# Patient Record
Sex: FEMALE | Race: WHITE | Employment: UNEMPLOYED | URBAN - METROPOLITAN AREA
[De-identification: names, ages, dates, MRNs, and addresses within clinical notes are randomized per-mention and may not be internally consistent; named-entity substitution may affect disease eponyms.]

---

## 2018-03-16 ENCOUNTER — OFFICE VISIT (OUTPATIENT)
Dept: FAMILY MEDICINE CLINIC | Age: 38
End: 2018-03-16

## 2018-03-16 VITALS
HEIGHT: 61 IN | OXYGEN SATURATION: 99 % | WEIGHT: 128.6 LBS | BODY MASS INDEX: 24.28 KG/M2 | DIASTOLIC BLOOD PRESSURE: 70 MMHG | SYSTOLIC BLOOD PRESSURE: 104 MMHG | HEART RATE: 92 BPM

## 2018-03-16 DIAGNOSIS — M79.7 FIBROMYALGIA: ICD-10-CM

## 2018-03-16 DIAGNOSIS — G43.809 OTHER MIGRAINE WITHOUT STATUS MIGRAINOSUS, NOT INTRACTABLE: ICD-10-CM

## 2018-03-16 DIAGNOSIS — F33.41 RECURRENT MAJOR DEPRESSIVE DISORDER, IN PARTIAL REMISSION (HCC): Primary | ICD-10-CM

## 2018-03-16 DIAGNOSIS — R20.0 NUMBNESS AND TINGLING IN LEFT HAND: ICD-10-CM

## 2018-03-16 DIAGNOSIS — R20.2 NUMBNESS AND TINGLING IN LEFT HAND: ICD-10-CM

## 2018-03-16 DIAGNOSIS — N95.1 MENOPAUSAL SYMPTOM: ICD-10-CM

## 2018-03-16 DIAGNOSIS — M79.601 PAIN IN RIGHT ARM: ICD-10-CM

## 2018-03-16 PROCEDURE — G0444 DEPRESSION SCREEN ANNUAL: HCPCS | Performed by: FAMILY MEDICINE

## 2018-03-16 PROCEDURE — 99204 OFFICE O/P NEW MOD 45 MIN: CPT | Performed by: FAMILY MEDICINE

## 2018-03-16 RX ORDER — SUMATRIPTAN 100 MG/1
50 TABLET, FILM COATED ORAL
Qty: 9 TABLET | Refills: 1 | Status: SHIPPED | OUTPATIENT
Start: 2018-03-16 | End: 2018-11-16 | Stop reason: SDUPTHER

## 2018-03-16 RX ORDER — MELOXICAM 15 MG/1
15 TABLET ORAL DAILY
Qty: 30 TABLET | Refills: 3 | Status: SHIPPED | OUTPATIENT
Start: 2018-03-16 | End: 2019-04-10 | Stop reason: SDUPTHER

## 2018-03-16 RX ORDER — DULOXETIN HYDROCHLORIDE 60 MG/1
60 CAPSULE, DELAYED RELEASE ORAL DAILY
Qty: 30 CAPSULE | Refills: 3 | Status: SHIPPED | OUTPATIENT
Start: 2018-03-16 | End: 2018-07-03 | Stop reason: SDUPTHER

## 2018-03-16 RX ORDER — ESTRADIOL 1 MG/1
1 TABLET ORAL DAILY
Qty: 28 TABLET | Refills: 5 | Status: SHIPPED | OUTPATIENT
Start: 2018-03-16 | End: 2018-04-27

## 2018-03-16 ASSESSMENT — PATIENT HEALTH QUESTIONNAIRE - PHQ9
5. POOR APPETITE OR OVEREATING: 2
8. MOVING OR SPEAKING SO SLOWLY THAT OTHER PEOPLE COULD HAVE NOTICED. OR THE OPPOSITE, BEING SO FIGETY OR RESTLESS THAT YOU HAVE BEEN MOVING AROUND A LOT MORE THAN USUAL: 0
6. FEELING BAD ABOUT YOURSELF - OR THAT YOU ARE A FAILURE OR HAVE LET YOURSELF OR YOUR FAMILY DOWN: 1
2. FEELING DOWN, DEPRESSED OR HOPELESS: 2
SUM OF ALL RESPONSES TO PHQ9 QUESTIONS 1 & 2: 4
4. FEELING TIRED OR HAVING LITTLE ENERGY: 3
3. TROUBLE FALLING OR STAYING ASLEEP: 2
9. THOUGHTS THAT YOU WOULD BE BETTER OFF DEAD, OR OF HURTING YOURSELF: 0
7. TROUBLE CONCENTRATING ON THINGS, SUCH AS READING THE NEWSPAPER OR WATCHING TELEVISION: 1
1. LITTLE INTEREST OR PLEASURE IN DOING THINGS: 2
SUM OF ALL RESPONSES TO PHQ QUESTIONS 1-9: 13
10. IF YOU CHECKED OFF ANY PROBLEMS, HOW DIFFICULT HAVE THESE PROBLEMS MADE IT FOR YOU TO DO YOUR WORK, TAKE CARE OF THINGS AT HOME, OR GET ALONG WITH OTHER PEOPLE: 3

## 2018-04-01 ASSESSMENT — ENCOUNTER SYMPTOMS
BLURRED VISION: 0
COUGH: 0
BACK PAIN: 1
ABDOMINAL PAIN: 0

## 2018-04-27 ENCOUNTER — OFFICE VISIT (OUTPATIENT)
Dept: FAMILY MEDICINE CLINIC | Age: 38
End: 2018-04-27

## 2018-04-27 VITALS
DIASTOLIC BLOOD PRESSURE: 60 MMHG | WEIGHT: 130.8 LBS | BODY MASS INDEX: 25.12 KG/M2 | HEART RATE: 84 BPM | SYSTOLIC BLOOD PRESSURE: 116 MMHG | OXYGEN SATURATION: 98 %

## 2018-04-27 DIAGNOSIS — F33.41 RECURRENT MAJOR DEPRESSIVE DISORDER, IN PARTIAL REMISSION (HCC): Primary | ICD-10-CM

## 2018-04-27 DIAGNOSIS — R20.0 NUMBNESS AND TINGLING IN LEFT HAND: ICD-10-CM

## 2018-04-27 DIAGNOSIS — M79.7 FIBROMYALGIA: ICD-10-CM

## 2018-04-27 DIAGNOSIS — M54.2 NECK PAIN: ICD-10-CM

## 2018-04-27 DIAGNOSIS — R20.2 NUMBNESS AND TINGLING IN LEFT HAND: ICD-10-CM

## 2018-04-27 DIAGNOSIS — N95.1 MENOPAUSAL SYNDROME: ICD-10-CM

## 2018-04-27 DIAGNOSIS — Z13.31 POSITIVE DEPRESSION SCREENING: ICD-10-CM

## 2018-04-27 DIAGNOSIS — M79.601 PAIN IN RIGHT ARM: ICD-10-CM

## 2018-04-27 PROCEDURE — 1036F TOBACCO NON-USER: CPT | Performed by: FAMILY MEDICINE

## 2018-04-27 PROCEDURE — 99214 OFFICE O/P EST MOD 30 MIN: CPT | Performed by: FAMILY MEDICINE

## 2018-04-27 PROCEDURE — G8427 DOCREV CUR MEDS BY ELIG CLIN: HCPCS | Performed by: FAMILY MEDICINE

## 2018-04-27 PROCEDURE — G8431 POS CLIN DEPRES SCRN F/U DOC: HCPCS | Performed by: FAMILY MEDICINE

## 2018-04-27 PROCEDURE — G8419 CALC BMI OUT NRM PARAM NOF/U: HCPCS | Performed by: FAMILY MEDICINE

## 2018-04-27 RX ORDER — ESTRADIOL 1 MG/1
1.5 TABLET ORAL DAILY
Qty: 45 TABLET | Refills: 5 | Status: SHIPPED | OUTPATIENT
Start: 2018-04-27 | End: 2019-05-02 | Stop reason: SDUPTHER

## 2018-04-27 ASSESSMENT — ENCOUNTER SYMPTOMS
BLURRED VISION: 0
ABDOMINAL PAIN: 0
COUGH: 0

## 2018-06-07 ENCOUNTER — OFFICE VISIT (OUTPATIENT)
Dept: PHYSICAL MEDICINE AND REHAB | Age: 38
End: 2018-06-07

## 2018-06-07 DIAGNOSIS — M79.602 PARESTHESIA AND PAIN OF BOTH UPPER EXTREMITIES: ICD-10-CM

## 2018-06-07 DIAGNOSIS — M79.601 PARESTHESIA AND PAIN OF BOTH UPPER EXTREMITIES: ICD-10-CM

## 2018-06-07 DIAGNOSIS — R20.2 PARESTHESIA AND PAIN OF BOTH UPPER EXTREMITIES: ICD-10-CM

## 2018-06-07 DIAGNOSIS — G56.03 BILATERAL CARPAL TUNNEL SYNDROME: Primary | ICD-10-CM

## 2018-06-07 PROCEDURE — 95911 NRV CNDJ TEST 9-10 STUDIES: CPT | Performed by: PHYSICAL MEDICINE & REHABILITATION

## 2018-06-07 PROCEDURE — 95886 MUSC TEST DONE W/N TEST COMP: CPT | Performed by: PHYSICAL MEDICINE & REHABILITATION

## 2018-06-21 ENCOUNTER — HOSPITAL ENCOUNTER (OUTPATIENT)
Dept: GENERAL RADIOLOGY | Age: 38
Discharge: HOME | End: 2018-06-21
Attending: FAMILY MEDICINE | Admitting: FAMILY MEDICINE

## 2018-06-21 DIAGNOSIS — R20.0 NUMBNESS OF HAND: ICD-10-CM

## 2018-06-22 ENCOUNTER — PATIENT MESSAGE (OUTPATIENT)
Dept: FAMILY MEDICINE CLINIC | Age: 38
End: 2018-06-22

## 2018-06-22 RX ORDER — CEPHALEXIN 500 MG/1
1000 CAPSULE ORAL 2 TIMES DAILY
Qty: 40 CAPSULE | Refills: 0 | Status: SHIPPED | OUTPATIENT
Start: 2018-06-22 | End: 2019-04-10

## 2018-07-03 ENCOUNTER — OFFICE VISIT (OUTPATIENT)
Dept: FAMILY MEDICINE CLINIC | Age: 38
End: 2018-07-03

## 2018-07-03 VITALS
OXYGEN SATURATION: 97 % | HEART RATE: 87 BPM | WEIGHT: 131.4 LBS | SYSTOLIC BLOOD PRESSURE: 114 MMHG | DIASTOLIC BLOOD PRESSURE: 74 MMHG | BODY MASS INDEX: 25.24 KG/M2

## 2018-07-03 DIAGNOSIS — M79.7 FIBROMYALGIA: Primary | ICD-10-CM

## 2018-07-03 DIAGNOSIS — G43.809 OTHER MIGRAINE WITHOUT STATUS MIGRAINOSUS, NOT INTRACTABLE: ICD-10-CM

## 2018-07-03 DIAGNOSIS — F33.41 RECURRENT MAJOR DEPRESSIVE DISORDER, IN PARTIAL REMISSION (HCC): ICD-10-CM

## 2018-07-03 DIAGNOSIS — M79.18 CERVICAL MYOFASCIAL PAIN SYNDROME: ICD-10-CM

## 2018-07-03 PROCEDURE — G8427 DOCREV CUR MEDS BY ELIG CLIN: HCPCS | Performed by: FAMILY MEDICINE

## 2018-07-03 PROCEDURE — G8419 CALC BMI OUT NRM PARAM NOF/U: HCPCS | Performed by: FAMILY MEDICINE

## 2018-07-03 PROCEDURE — 99214 OFFICE O/P EST MOD 30 MIN: CPT | Performed by: FAMILY MEDICINE

## 2018-07-03 PROCEDURE — 1036F TOBACCO NON-USER: CPT | Performed by: FAMILY MEDICINE

## 2018-07-03 RX ORDER — DULOXETIN HYDROCHLORIDE 60 MG/1
60 CAPSULE, DELAYED RELEASE ORAL 2 TIMES DAILY
Qty: 60 CAPSULE | Refills: 3 | Status: SHIPPED | OUTPATIENT
Start: 2018-07-03 | End: 2018-11-16 | Stop reason: SDUPTHER

## 2018-07-03 NOTE — PROGRESS NOTES
Chief Complaint   Patient presents with    Depression     Patient is here for a recheck on major depressive disorder and fatigue    Neurologic Problem     Follow up from consult with Dr Alina Calzada and EMG    Migraine     Patient with recurrent migraines and also fibromyalgia, this was diagnosed by previous physician, she is on Mobic and Cymbalta 60 daily and had previous therapy including need living and cupping that were helpful       [place] NARRATIVE:    Mild CTS by EMG. Report available in chart. Suspect myofascial pain syndrome per Dr. Alina Calzada. C/o fibromyalgia like symptoms, previous doctor diagnosed her with fibromyalgia. She is on mobic and cymbalta 60 daily. Previously helped by needling and cupping. C/o fatigue and having to work in office for days making her tired and bored. This patient with bad migraine last week and some persistent sx into this week. Used imitrex with good results. Patient is established unless otherwise noted. Above chief complaint and [place] obtained by this physician provider. Review of Systems   Constitutional: Positive for malaise/fatigue. Negative for fever. HENT: Negative for congestion. Eyes: Negative for blurred vision. Respiratory: Negative for cough. Cardiovascular: Negative for chest pain and leg swelling. Gastrointestinal: Negative for abdominal pain. Musculoskeletal: Positive for back pain, myalgias and neck pain. Skin: Negative for rash. Neurological: Positive for tingling and headaches. Psychiatric/Behavioral: Negative for depression. The patient is not nervous/anxious. Allergies   Allergen Reactions    Benadryl [Diphenhydramine Hcl] Other (See Comments)     Shaking and itching    Pseudoeph-Doxylamine-Dm-Apap Other (See Comments)     Shaking and itching      Bactrim [Sulfamethoxazole-Trimethoprim] Nausea And Vomiting     Allergy history updated.     Outpatient Prescriptions Marked as Taking for the 7/3/18 encounter (Office reviewed. _________________________________________________  Assessment:     Erica Barajas was seen today for depression, neurologic problem and migraine. Diagnoses and all orders for this visit:    Fibromyalgia  -     [facility]    Recurrent major depressive disorder, in partial remission ([facility] [address]  -     DULoxetine (CYMBALTA) 60 MG extended release capsule; Take 1 capsule by mouth 2 times daily    Cervical myofascial pain syndrome  -     [facility]    Other migraine without status migrainosus, not intractable      Problems listed above are stable and therapeutic plan is unchanged unless otherwise specified. Multiple problems above are not doing well. Only the migraine is stable. We'll keep current medications but make changes as below. See orders above and comments below for details of workup or medication orders. _________________________________________________  Plan:     Options for treatment discussed. We will not proceed with formal carpal tunnel surgical referral.  Physical medicine thinks most of her problems relate to fibromyalgia. We will double her Cymbalta to 60 twice a day and see if this helps with mood and fibromyalgia pain. We will refer to physical therapy for assistance in managing fibromyalgia and more localized cervical myofascial pain syndrome. We will not change medications for migraines at this time but continue current therapy. Return in about 2 months (around 9/3/2018), or if symptoms worsen or fail to improve, for recheck on new med.       Electronically signed by Chiquita Victoria MD on 7/3/18 at 9:20 AM

## 2018-07-23 ENCOUNTER — HOSPITAL ENCOUNTER (OUTPATIENT)
Dept: OTHER | Age: 38
Discharge: HOME | End: 2018-07-31
Attending: FAMILY MEDICINE | Admitting: FAMILY MEDICINE

## 2018-07-23 PROBLEM — F33.41 RECURRENT MAJOR DEPRESSIVE DISORDER, IN PARTIAL REMISSION (HCC): Status: ACTIVE | Noted: 2018-07-23

## 2018-07-23 PROBLEM — G43.909 MIGRAINE: Status: ACTIVE | Noted: 2018-07-23

## 2018-07-23 PROBLEM — M79.18 CERVICAL MYOFASCIAL PAIN SYNDROME: Status: ACTIVE | Noted: 2018-07-23

## 2018-07-23 PROBLEM — M79.7 FIBROMYALGIA: Status: ACTIVE | Noted: 2018-07-23

## 2018-07-23 ASSESSMENT — ENCOUNTER SYMPTOMS
BLURRED VISION: 0
ABDOMINAL PAIN: 0
BACK PAIN: 1
COUGH: 0

## 2018-08-01 ENCOUNTER — HOSPITAL ENCOUNTER (OUTPATIENT)
Dept: OTHER | Age: 38
Discharge: HOME | End: 2018-08-31
Attending: FAMILY MEDICINE | Admitting: FAMILY MEDICINE

## 2018-08-09 ENCOUNTER — PATIENT MESSAGE (OUTPATIENT)
Dept: FAMILY MEDICINE CLINIC | Age: 38
End: 2018-08-09

## 2018-08-09 NOTE — TELEPHONE ENCOUNTER
From: Elfego Carrasco  To: Nahomi Childress MD  Sent: 8/9/2018 2:22 PM EDT  Subject: Prescription Question    Hi Tati, well I am finally moved to [place] and it has been a hot mess from day one. Anyways, I was messaging to see if there is a way I could get a small RX for some Tramadol or something. I think I spelled that wrong too. The twice a day Cymbalata is not even taking the edge off of my pain. I can't afford to see a new physician yet - I lost my job in the process of our move.      My new phone number is [phone]  Thanks Bentley gonsales

## 2018-11-16 DIAGNOSIS — G43.809 OTHER MIGRAINE WITHOUT STATUS MIGRAINOSUS, NOT INTRACTABLE: ICD-10-CM

## 2018-11-16 DIAGNOSIS — F33.41 RECURRENT MAJOR DEPRESSIVE DISORDER, IN PARTIAL REMISSION (HCC): ICD-10-CM

## 2018-11-16 NOTE — TELEPHONE ENCOUNTER
From: Francisco Burton  Sent: 11/16/2018 11:20 AM EST  Subject: Medication Renewal Request    Francisco Burton would like a refill of the following medications:     SUMAtriptan (IMITREX) 100 MG tablet Martin Godinez MD]     DULoxetine (CYMBALTA) 60 MG extended release capsule Martin Godinez MD]    Preferred pharmacy: [address] [facility] [phone] - F [phone]

## 2018-11-19 RX ORDER — DULOXETIN HYDROCHLORIDE 60 MG/1
60 CAPSULE, DELAYED RELEASE ORAL 2 TIMES DAILY
Qty: 60 CAPSULE | Refills: 1 | Status: SHIPPED | OUTPATIENT
Start: 2018-11-19 | End: 2019-03-04 | Stop reason: SDUPTHER

## 2018-11-19 RX ORDER — SUMATRIPTAN 100 MG/1
50 TABLET, FILM COATED ORAL
Qty: 9 TABLET | Refills: 1 | Status: SHIPPED | OUTPATIENT
Start: 2018-11-19 | End: 2019-04-10 | Stop reason: SDUPTHER

## 2019-04-10 ENCOUNTER — OFFICE VISIT (OUTPATIENT)
Dept: FAMILY MEDICINE CLINIC | Age: 39
End: 2019-04-10
Payer: COMMERCIAL

## 2019-04-10 VITALS
HEIGHT: 61 IN | OXYGEN SATURATION: 93 % | BODY MASS INDEX: 24.24 KG/M2 | DIASTOLIC BLOOD PRESSURE: 60 MMHG | SYSTOLIC BLOOD PRESSURE: 94 MMHG | HEART RATE: 84 BPM | WEIGHT: 128.4 LBS

## 2019-04-10 DIAGNOSIS — F33.41 RECURRENT MAJOR DEPRESSIVE DISORDER, IN PARTIAL REMISSION (HCC): ICD-10-CM

## 2019-04-10 DIAGNOSIS — G56.21 ULNAR NERVE ENTRAPMENT AT ELBOW, RIGHT: ICD-10-CM

## 2019-04-10 DIAGNOSIS — M79.7 FIBROMYALGIA: Primary | ICD-10-CM

## 2019-04-10 DIAGNOSIS — G43.809 OTHER MIGRAINE WITHOUT STATUS MIGRAINOSUS, NOT INTRACTABLE: ICD-10-CM

## 2019-04-10 DIAGNOSIS — B35.1 ONYCHOMYCOSIS OF TOENAIL: ICD-10-CM

## 2019-04-10 PROCEDURE — 99214 OFFICE O/P EST MOD 30 MIN: CPT | Performed by: FAMILY MEDICINE

## 2019-04-10 PROCEDURE — 1036F TOBACCO NON-USER: CPT | Performed by: FAMILY MEDICINE

## 2019-04-10 PROCEDURE — G8427 DOCREV CUR MEDS BY ELIG CLIN: HCPCS | Performed by: FAMILY MEDICINE

## 2019-04-10 PROCEDURE — G8420 CALC BMI NORM PARAMETERS: HCPCS | Performed by: FAMILY MEDICINE

## 2019-04-10 RX ORDER — SUMATRIPTAN 100 MG/1
50 TABLET, FILM COATED ORAL
Qty: 9 TABLET | Refills: 3 | Status: SHIPPED | OUTPATIENT
Start: 2019-04-10 | End: 2020-06-09

## 2019-04-10 RX ORDER — LIDOCAINE 50 MG/G
3 PATCH TOPICAL DAILY
Qty: 90 PATCH | Refills: 0 | Status: SHIPPED | OUTPATIENT
Start: 2019-04-10 | End: 2019-06-25 | Stop reason: SDUPTHER

## 2019-04-10 RX ORDER — MELOXICAM 15 MG/1
15 TABLET ORAL DAILY
Qty: 30 TABLET | Refills: 3 | Status: SHIPPED | OUTPATIENT
Start: 2019-04-10 | End: 2020-07-30

## 2019-04-10 RX ORDER — MINOCYCLINE HYDROCHLORIDE 100 MG/1
1 CAPSULE ORAL 2 TIMES DAILY
COMMUNITY
End: 2020-07-30

## 2019-04-10 RX ORDER — TERBINAFINE HYDROCHLORIDE 250 MG/1
250 TABLET ORAL DAILY
Qty: 7 TABLET | Refills: 3 | Status: SHIPPED | OUTPATIENT
Start: 2019-04-10 | End: 2020-06-09

## 2019-04-10 RX ORDER — DULOXETIN HYDROCHLORIDE 60 MG/1
60 CAPSULE, DELAYED RELEASE ORAL 2 TIMES DAILY
Qty: 60 CAPSULE | Refills: 5 | Status: SHIPPED | OUTPATIENT
Start: 2019-04-10 | End: 2020-04-06 | Stop reason: SDUPTHER

## 2019-04-10 ASSESSMENT — ENCOUNTER SYMPTOMS
CHEST TIGHTNESS: 0
ABDOMINAL PAIN: 0
WHEEZING: 0
COUGH: 0
BACK PAIN: 0
SHORTNESS OF BREATH: 0

## 2019-04-10 ASSESSMENT — PATIENT HEALTH QUESTIONNAIRE - PHQ9
SUM OF ALL RESPONSES TO PHQ9 QUESTIONS 1 & 2: 0
2. FEELING DOWN, DEPRESSED OR HOPELESS: 0
SUM OF ALL RESPONSES TO PHQ QUESTIONS 1-9: 0
1. LITTLE INTEREST OR PLEASURE IN DOING THINGS: 0
SUM OF ALL RESPONSES TO PHQ QUESTIONS 1-9: 0

## 2019-04-10 NOTE — PROGRESS NOTES
Allergies   Allergen Reactions    Benadryl [Diphenhydramine Hcl] Other (See Comments)     Shaking and itching    Pseudoeph-Doxylamine-Dm-Apap Other (See Comments)     Shaking and itching      Bactrim [Sulfamethoxazole-Trimethoprim] Nausea And Vomiting     Allergy historyupdated. Outpatient Medications Marked as Taking for the 4/10/19 encounter (Office Visit) with Donta Kang MD   Medication Sig Dispense Refill    minocycline (MINOCIN;DYNACIN) 100 MG capsule Take 1 capsule by mouth 2 times daily      lidocaine (LIDODERM) 5 % Place 3 patches onto the skin daily 12 hours on, 12 hours off. 90 patch 0    DULoxetine (CYMBALTA) 60 MG extended release capsule Take 1 capsule by mouth 2 times daily 60 capsule 5    SUMAtriptan (IMITREX) 100 MG tablet Take 0.5 tablets by mouth once as needed for Migraine 9 tablet 3    meloxicam (MOBIC) 15 MG tablet Take 1 tablet by mouth daily 30 tablet 3    [] terbinafine (LAMISIL) 250 MG tablet Take 1 tablet by mouth daily for 7 days Take daily for one week out of each month for 4 months total 7 tablet 3    estradiol (ESTRACE) 1 MG tablet Take 1.5 tablets by mouth daily 45 tablet 5       Tobacco use history updated. Social History     Tobacco Use   Smoking Status Former Smoker    Types: E-Cigarettes   Smokeless Tobacco Never Used        Nursing note reviewed. Vitals:    04/10/19 0856   BP: 94/60   Site: Left Upper Arm   Position: Sitting   Cuff Size: Small Adult   Pulse: 84   SpO2: 93%   Weight: 128 lb 6.4 oz (58.2 kg)   Height: 5' 0.5\" (1.537 m)          BP Readings from Last 3 Encounters:   04/10/19 94/60   18 114/74   18 116/60     Wt Readings from Last 3 Encounters:   04/10/19 128 lb 6.4 oz (58.2 kg)   18 131 lb 6.4 oz (59.6 kg)   18 130 lb 12.8 oz (59.3 kg)     Body mass index is 24.66 kg/m². No results found for this visit on 04/10/19. Physical Exam   Constitutional: She is oriented to person, place, and time.  She appears well-developed and well-nourished. No distress. HENT:   Head: Normocephalic. Mouth/Throat: Oropharynx is clear and moist.   Eyes: Pupils are equal, round, and reactive to light. Conjunctivae are normal. Right eye exhibits no discharge. Left eye exhibits no discharge. Neck: Normal range of motion. Cardiovascular: Normal rate, regular rhythm and normal heart sounds. No murmur heard. Pulmonary/Chest: Effort normal and breath sounds normal. No respiratory distress. She has no wheezes. She has no rales. Neurological: She is alert and oriented to person, place, and time. Skin: Skin is warm and dry. She is not diaphoretic. Psychiatric: She has a normal mood and affect. Her behavior is normal.   Nursing note and vitals reviewed. _________________________________________________  Assessment:     Kathrin Hanson was seen today for chronic pain, carpal tunnel, depression and migraine. Diagnoses and all orders for this visit:    Fibromyalgia  -     lidocaine (LIDODERM) 5 %; Place 3 patches onto the skin daily 12 hours on, 12 hours off.  -     meloxicam (MOBIC) 15 MG tablet; Take 1 tablet by mouth daily    Ulnar nerve entrapment at elbow, right  -     lidocaine (LIDODERM) 5 %; Place 3 patches onto the skin daily 12 hours on, 12 hours off. Recurrent major depressive disorder, in partial remission (HCC)  -     DULoxetine (CYMBALTA) 60 MG extended release capsule; Take 1 capsule by mouth 2 times daily    Other migraine without status migrainosus, not intractable  -     SUMAtriptan (IMITREX) 100 MG tablet; Take 0.5 tablets by mouth once as needed for Migraine    Onychomycosis of toenail  -     terbinafine (LAMISIL) 250 MG tablet; Take 1 tablet by mouth daily for 7 days Take daily for one week out of each month for 4 months total      Problems listed above are stable and therapeutic plan is unchanged unless otherwise specified.   See orders above and comments below for details of workup ormedication orders. _________________________________________________  Plan:     Declines hand surgeon referral for now. Will proceed with chiro treatment plans. Refills as above for migraine, she will track frequency and severity and triggers. Pulsed dose trial of medication for onychomycosis. Return in about 2 months (around 6/10/2019), or if symptoms worsen or fail to improve, for recheck chronic medical problem.       Electronically signed by Jasiel Herr MD on 4/10/19 at 9:12 AM

## 2019-05-02 DIAGNOSIS — N95.1 MENOPAUSAL SYNDROME: ICD-10-CM

## 2019-05-05 RX ORDER — ESTRADIOL 1 MG/1
TABLET ORAL
Qty: 30 TABLET | Refills: 8 | Status: SHIPPED | OUTPATIENT
Start: 2019-05-05 | End: 2020-02-13

## 2019-05-15 ENCOUNTER — TELEPHONE (OUTPATIENT)
Dept: FAMILY MEDICINE CLINIC | Age: 39
End: 2019-05-15

## 2019-05-15 NOTE — TELEPHONE ENCOUNTER
Please let patient know we were unable to get this medication approved. We can have her see PT or a pain specialist to evaluate her elbow pain. Unfortunately it is very difficult to get this medication approved for most diagnoses.

## 2019-06-25 ENCOUNTER — OFFICE VISIT (OUTPATIENT)
Dept: FAMILY MEDICINE CLINIC | Age: 39
End: 2019-06-25
Payer: COMMERCIAL

## 2019-06-25 VITALS
WEIGHT: 127 LBS | DIASTOLIC BLOOD PRESSURE: 60 MMHG | SYSTOLIC BLOOD PRESSURE: 112 MMHG | BODY MASS INDEX: 24.39 KG/M2 | OXYGEN SATURATION: 96 % | HEART RATE: 87 BPM

## 2019-06-25 DIAGNOSIS — M54.50 CHRONIC MIDLINE LOW BACK PAIN WITHOUT SCIATICA: Primary | ICD-10-CM

## 2019-06-25 DIAGNOSIS — G89.29 CHRONIC MIDLINE LOW BACK PAIN WITHOUT SCIATICA: Primary | ICD-10-CM

## 2019-06-25 DIAGNOSIS — G56.01 RIGHT CARPAL TUNNEL SYNDROME: ICD-10-CM

## 2019-06-25 DIAGNOSIS — G56.21 ULNAR NERVE ENTRAPMENT AT ELBOW, RIGHT: ICD-10-CM

## 2019-06-25 DIAGNOSIS — M79.7 FIBROMYALGIA: ICD-10-CM

## 2019-06-25 PROCEDURE — 99214 OFFICE O/P EST MOD 30 MIN: CPT | Performed by: FAMILY MEDICINE

## 2019-06-25 PROCEDURE — G8427 DOCREV CUR MEDS BY ELIG CLIN: HCPCS | Performed by: FAMILY MEDICINE

## 2019-06-25 PROCEDURE — 1036F TOBACCO NON-USER: CPT | Performed by: FAMILY MEDICINE

## 2019-06-25 PROCEDURE — G8420 CALC BMI NORM PARAMETERS: HCPCS | Performed by: FAMILY MEDICINE

## 2019-06-25 RX ORDER — LIDOCAINE 50 MG/G
3 PATCH TOPICAL DAILY
Qty: 90 PATCH | Refills: 0 | Status: SHIPPED | OUTPATIENT
Start: 2019-06-25 | End: 2019-07-25

## 2019-06-25 ASSESSMENT — PATIENT HEALTH QUESTIONNAIRE - PHQ9
SUM OF ALL RESPONSES TO PHQ QUESTIONS 1-9: 0
SUM OF ALL RESPONSES TO PHQ QUESTIONS 1-9: 0
2. FEELING DOWN, DEPRESSED OR HOPELESS: 0
1. LITTLE INTEREST OR PLEASURE IN DOING THINGS: 0
SUM OF ALL RESPONSES TO PHQ9 QUESTIONS 1 & 2: 0

## 2019-06-25 NOTE — PROGRESS NOTES
reactive to light. Conjunctivae are normal. Right eye exhibits no discharge. Left eye exhibits no discharge. Neck: Normal range of motion. Cardiovascular: Normal rate, regular rhythm and normal heart sounds. No murmur heard. Pulmonary/Chest: Effort normal and breath sounds normal. No respiratory distress. She has no wheezes. She has no rales. Musculoskeletal: She exhibits tenderness (Mild tenderness to palpation of low back with spasm present and paraspinal muscles bilaterally). She exhibits no edema or deformity. Neurological: She is alert and oriented to person, place, and time. She displays normal reflexes. No cranial nerve deficit or sensory deficit (Tingling and numbness in the right hand). She exhibits normal muscle tone. Coordination normal.   Skin: Skin is warm and dry. She is not diaphoretic. Psychiatric: She has a normal mood and affect. Her behavior is normal.   Nursing note and vitals reviewed. _________________________________________________  Assessment:     Maritza Menard was seen today for chronic pain, back pain and hand pain. Diagnoses and all orders for this visit:    Chronic midline low back pain without sciatica  -     XR LUMBAR SPINE (2-3 VIEWS); Future  -     lidocaine (LIDODERM) 5 %; Place 3 patches onto the skin daily 12 hours on, 12 hours off. Right carpal tunnel syndrome  -     Moshe Collier MD, Physical Medicine, [place]    Fibromyalgia    Ulnar nerve entrapment at elbow, right      Problems listed above are stable except as follows: Midline low back pain is worsened. Hands are not improved and she is asking for referral as current conservative interventions have not been helpful. We also need to diagnose any nerve entrapment and consider referral for that. Therapeutic plan is unchanged unless otherwise specified.       See orders above and comments below for details of workup or medication

## 2019-07-02 ENCOUNTER — TELEPHONE (OUTPATIENT)
Dept: FAMILY MEDICINE CLINIC | Age: 39
End: 2019-07-02

## 2019-07-02 NOTE — TELEPHONE ENCOUNTER
----- Message from Carmen Long sent at 7/2/2019  8:21 AM EDT -----      ----- Message -----  From: Krista Dangelo MD  Sent: 6/25/2019   4:43 PM  To: Bobby Ojeda MA    Can you resend referral to [facility] please she had never heard she will need emg of right and left arms I think

## 2019-07-15 ASSESSMENT — ENCOUNTER SYMPTOMS
BACK PAIN: 1
ABDOMINAL PAIN: 0
WHEEZING: 0
SHORTNESS OF BREATH: 0
COUGH: 0
CHEST TIGHTNESS: 0

## 2019-08-23 ENCOUNTER — OFFICE VISIT (OUTPATIENT)
Dept: FAMILY MEDICINE CLINIC | Age: 39
End: 2019-08-23
Payer: COMMERCIAL

## 2019-08-23 VITALS
OXYGEN SATURATION: 99 % | DIASTOLIC BLOOD PRESSURE: 64 MMHG | HEART RATE: 79 BPM | HEIGHT: 61 IN | BODY MASS INDEX: 23.71 KG/M2 | WEIGHT: 125.6 LBS | TEMPERATURE: 98.5 F | SYSTOLIC BLOOD PRESSURE: 102 MMHG

## 2019-08-23 DIAGNOSIS — M21.612 BUNION OF GREAT TOE OF LEFT FOOT: Primary | ICD-10-CM

## 2019-08-23 DIAGNOSIS — M21.611 BUNION OF GREAT TOE OF RIGHT FOOT: ICD-10-CM

## 2019-08-23 PROCEDURE — G8427 DOCREV CUR MEDS BY ELIG CLIN: HCPCS | Performed by: NURSE PRACTITIONER

## 2019-08-23 PROCEDURE — G8420 CALC BMI NORM PARAMETERS: HCPCS | Performed by: NURSE PRACTITIONER

## 2019-08-23 PROCEDURE — 99213 OFFICE O/P EST LOW 20 MIN: CPT | Performed by: NURSE PRACTITIONER

## 2019-08-23 PROCEDURE — 1036F TOBACCO NON-USER: CPT | Performed by: NURSE PRACTITIONER

## 2019-08-23 RX ORDER — NAPROXEN 500 MG/1
500 TABLET ORAL 2 TIMES DAILY WITH MEALS
Qty: 14 TABLET | Refills: 3 | Status: SHIPPED | OUTPATIENT
Start: 2019-08-23 | End: 2020-07-30 | Stop reason: SDUPTHER

## 2019-08-23 ASSESSMENT — ENCOUNTER SYMPTOMS
WHEEZING: 0
ABDOMINAL DISTENTION: 0
SHORTNESS OF BREATH: 0
DIARRHEA: 0
EYE PAIN: 0
CHEST TIGHTNESS: 0
NAUSEA: 0
TROUBLE SWALLOWING: 0
ABDOMINAL PAIN: 0
RHINORRHEA: 0

## 2019-08-23 NOTE — PATIENT INSTRUCTIONS
your foot on a pillow when you ice your toe or anytime you sit or lie down. Try to keep it above the level of your heart. This will help reduce swelling. When should you call for help? Call your doctor now or seek immediate medical care if:    · You have severe pain.     · Your toe is cool or pale or changes color.     · You have tingling, weakness, or numbness in the toe.    Watch closely for changes in your health, and be sure to contact your doctor if:    · Pain and swelling get worse.     · You do not get better as expected. Where can you learn more? Go to https://Magma Flooringpepiceweb.Cisiv. org and sign in to your [employer] account. Enter H210 in the TheOfficialBoard box to learn more about \"[place]: Care Instructions. \"     If you do not have an account, please click on the \"Sign Up Now\" link. Current as of: September 20, 2018  Content Version: 12.1  © 7029-5960 Healthwise, Incorporated. Care instructions adapted under license by [facility] ([facility]). If you have questions about a medical condition or this instruction, always ask your healthcare professional. [place] [address] any warranty or liability for your use of this information.

## 2019-08-23 NOTE — PROGRESS NOTES
Tio Osborn  1980   44 y.o. SUBJECT MAR[employer]:    Chief Complaint   Patient presents with    Toe Pain     both feet      Past Medical History:   Diagnosis Date    Endometriosis     Migraines      Past Surgical History:   Procedure Laterality Date    ABDOMEN SURGERY      endometriosis    HYSTERECTOMY       Current Outpatient Medications   Medication Sig Dispense Refill    naproxen (NAPROXEN) 500 MG EC tablet Take 1 tablet by mouth 2 times daily (with meals) 14 tablet 3    estradiol (ESTRACE) 1 MG tablet TAKE 1 & 1/2 (ONE & ONE-HALF) TABLETS BY MOUTH ONCE DAILY 30 tablet 8    minocycline (MINOCIN;DYNACIN) 100 MG capsule Take 1 capsule by mouth 2 times daily      DULoxetine (CYMBALTA) 60 MG extended release capsule Take 1 capsule by mouth 2 times daily 60 capsule 5    meloxicam (MOBIC) 15 MG tablet Take 1 tablet by mouth daily 30 tablet 3    SUMAtriptan (IMITREX) 100 MG tablet Take 0.5 tablets by mouth once as needed for Migraine 9 tablet 3     No current facility-administered medications for this visit. Family History   Problem Relation Age of Onset   Hair Cancer Mother     Other Mother         epilepsy    Depression Mother     High Cholesterol Father     Hypertension Father     Allergies Father        Foot Pain    Pain location: bilateral great toes. This is a new problem. The current episode started more than 1 month ago. The problem occurs constantly. The problem has been gradually worsening. The quality of the pain is described as burning. The pain is at a severity of 5/10. The pain is moderate. Pertinent negatives include no fever, itching, joint locking, numbness or tingling. Associated symptoms comments: Pain with walking. . The symptoms are aggravated by activity. She has tried nothing for the symptoms. The treatment provided no relief. Family history does not include gout or rheumatoid arthritis. There is no history of diabetes, gout, osteoarthritis or rheumatoid arthritis. Review of Systems   Constitutional: Negative. Negative for appetite change, fatigue and fever. HENT: Negative for congestion, dental problem, hearing loss, rhinorrhea and trouble swallowing. Eyes: Negative for pain. Respiratory: Negative for chest tightness, shortness of breath and wheezing. Cardiovascular: Negative for chest pain, palpitations and leg swelling. Gastrointestinal: Negative for abdominal distention, abdominal pain, diarrhea and nausea. Endocrine: Negative for cold intolerance and polydipsia. Genitourinary: Negative for difficulty urinating and frequency. Musculoskeletal: Negative for arthralgias, gait problem and gout. Bilateral great toe pain. Skin: Negative for itching and rash. Allergic/Immunologic: Negative for environmental allergies. Neurological: Negative for dizziness, tingling, seizures, syncope and numbness. Hematological: Does not bruise/bleed easily. Psychiatric/Behavioral: Negative for behavioral problems and suicidal ideas. OBJECTIVE:     /64   Pulse 79   Temp 98.5 °F (36.9 °C) (Oral)   Ht 5' 1\" (1.549 m)   Wt 125 lb 9.6 oz (57 kg)   LMP 10/10/2012   SpO2 99%   Breastfeeding? No   BMI 23.73 kg/m²   Wt Readings from Last 3 Encounters:   08/23/19 125 lb 9.6 oz (57 kg)   06/25/19 127 lb (57.6 kg)   04/10/19 128 lb 6.4 oz (58.2 kg)       Physical Exam   Constitutional: She is oriented to person, place, and time. She appears well-developed and well-nourished. HENT:   Head: Normocephalic and atraumatic. Right Ear: Hearing, tympanic membrane and ear canal normal.   Left Ear: Hearing, tympanic membrane and ear canal normal.   Nose: Nose normal.   Mouth/Throat: Uvula is midline and oropharynx is clear and moist.   Eyes: Pupils are equal, round, and reactive to light. Conjunctivae are normal.   Neck: Normal range of motion. Cardiovascular: Normal rate, regular rhythm and normal heart sounds.    Pulmonary/Chest: Effort normal and breath sounds normal. She has no decreased breath sounds. She has no wheezes. She has no rhonchi. She has no rales. Abdominal: Soft. Bowel sounds are normal. There is no tenderness. Musculoskeletal: Normal range of motion. Bilateral great toe bunions, mild erythema and tenderness. Normal ROM. +pedal pulses. No s/s of infection. Neurological: She is alert and oriented to person, place, and time. Skin: Skin is warm and dry. No rash noted. Psychiatric: She has a normal mood and affect. Her behavior is normal. Judgment normal.       ASSESSMENT/ PLAN:    1. Bunion of great toe of left foot  - Discussed ice 3-4 times daily.   - NSAIDS. - Foot pads for lateral side. - Podiatry and xray to evaluate severity. - XR TOE LEFT (MIN 2 VIEWS); Future  - naproxen (NAPROXEN) 500 MG EC tablet; Take 1 tablet by mouth 2 times daily (with meals)  Dispense: 14 tablet; Refill: 3  - External Referral To Podiatry    2. Bunion of great toe of right foot  - Discussed ice 3-4 times daily.   - NSAIDS. - Foot pads for lateral side. - Podiatry and xray to evaluate severity. - XR TOE RIGHT (MIN 2 VIEWS); Future  - naproxen (NAPROXEN) 500 MG EC tablet; Take 1 tablet by mouth 2 times daily (with meals)  Dispense: 14 tablet; Refill: 3  - External Referral To Podiatry  - All prescriptions ordered and discussed in detail with patient; aware of possible side effects  - AVS explained and given to patient today  - All questions answered and patient verbalizes understanding, no further questions or concerns  - Aware to follow up with any worsening or no improvement in symptoms      Orders Placed This Encounter   Procedures    XR TOE RIGHT (MIN 2 VIEWS)     Standing Status:   Future     Standing Expiration Date:   8/23/2020     Order Specific Question:   Reason for exam:     Answer:   bunion, evaluate severity.     XR TOE LEFT (MIN 2 VIEWS)     Standing Status:   Future     Standing Expiration Date:   8/23/2020     Order Specific

## 2019-08-23 NOTE — LETTER
[facility],[address] Floor WALK-IN CARE  [address] [place] [address] [address] [id number]  Phone: [phone]  Fax: Wily Pham 23, APRN - CNP        August 23, 2019     Patient: Tacho Hurtado   YOB: 1980   Date of Visit: 8/23/2019       To Whom It May Concern: It is my medical opinion that Tacho Hurtado on 8/26/19. If you have any questions or concerns, please don't hesitate to call.     Sincerely,        DEBBY Naidu - CNP

## 2019-08-28 ENCOUNTER — OFFICE VISIT (OUTPATIENT)
Dept: FAMILY MEDICINE CLINIC | Age: 39
End: 2019-08-28
Payer: COMMERCIAL

## 2019-08-28 VITALS
BODY MASS INDEX: 23.32 KG/M2 | SYSTOLIC BLOOD PRESSURE: 96 MMHG | OXYGEN SATURATION: 100 % | WEIGHT: 123.4 LBS | DIASTOLIC BLOOD PRESSURE: 66 MMHG | HEART RATE: 84 BPM

## 2019-08-28 DIAGNOSIS — G56.03 BILATERAL CARPAL TUNNEL SYNDROME: Primary | ICD-10-CM

## 2019-08-28 PROCEDURE — G8427 DOCREV CUR MEDS BY ELIG CLIN: HCPCS | Performed by: NURSE PRACTITIONER

## 2019-08-28 PROCEDURE — G8420 CALC BMI NORM PARAMETERS: HCPCS | Performed by: NURSE PRACTITIONER

## 2019-08-28 PROCEDURE — 99213 OFFICE O/P EST LOW 20 MIN: CPT | Performed by: NURSE PRACTITIONER

## 2019-08-28 PROCEDURE — 1036F TOBACCO NON-USER: CPT | Performed by: NURSE PRACTITIONER

## 2019-08-28 ASSESSMENT — ENCOUNTER SYMPTOMS
CHEST TIGHTNESS: 0
NAUSEA: 0
SORE THROAT: 0
CHANGE IN BOWEL HABIT: 0
COUGH: 0
SHORTNESS OF BREATH: 0
VOMITING: 0
SWOLLEN GLANDS: 0
ABDOMINAL PAIN: 0
VISUAL CHANGE: 0
ABDOMINAL DISTENTION: 0

## 2019-08-28 NOTE — PATIENT INSTRUCTIONS
Be safe with medicines. Read and follow all instructions on the label. · If your doctor prescribes corticosteroid medicine to help reduce pain and swelling, take it exactly as prescribed. Call your doctor if you think you are having a problem with your medicine. · Put ice or a cold pack on your wrist for 10 to 20 minutes at a time to ease pain. Put a thin cloth between the ice and your skin. · If your doctor or your physical or occupational therapist tells you to wear a wrist splint, wear it as directed to keep your wrist in a neutral position. This also eases pressure on your median nerve. · Ask your doctor whether you should have physical or occupational therapy to learn how to do tasks differently. · Try a yoga class to stretch your muscles and build strength in your hands and wrists. Yoga has been shown to ease carpal tunnel symptoms. To prevent carpal tunnel  · When working at a computer, keep your hands and wrists in line with your forearms. Hold your elbows close to your sides. Take a break every 10 to 15 minutes. · Try these exercises:  ? Warm up: Rotate your wrist up, down, and from side to side. Repeat this 4 times. Stretch your fingers far apart, relax them, then stretch them again. Repeat 4 times. Stretch your thumb by pulling it back gently, holding it, and then releasing it. Repeat 4 times. ? Prayer stretch: Start with your palms together in front of your chest just below your chin. Slowly lower your hands toward your waistline while keeping your hands close to your stomach and your palms together until you feel a mild to moderate stretch under your forearms. Hold for 10 to 20 seconds. Repeat 4 times. ? Wrist flexor stretch: Hold your arm in front of you with your palm up. Bend your wrist, pointing your hand toward the floor. With your other hand, gently bend your wrist further until you feel a mild to moderate stretch in your forearm. Hold for 10 to 20 seconds. Repeat 4 times.   ? Wrist rehabilitation. Start each exercise slowly. Ease off the exercises if you start to have pain. You will be told when to start these exercises and which ones will work best for you. Warm-up stretches  When you no longer have pain or numbness, you can do exercises to help prevent carpal tunnel syndrome from coming back. Do not do any stretch or movement that is uncomfortable or painful. 1. Rotate your wrist up, down, and from side to side. Repeat 4 times. 2. Stretch your fingers far apart. Relax them, and then stretch them again. Repeat 4 times. 3. Stretch your thumb by pulling it back gently, holding it, and then releasing it. Repeat 4 times. How to do the exercises  Prayer stretch    1. Start with your palms together in front of your chest just below your chin. 2. Slowly lower your hands toward your waistline, keeping your hands close to your stomach and your palms together until you feel a mild to moderate stretch under your forearms. 3. Hold for at least 15 to 30 seconds. Repeat 2 to 4 times. Wrist flexor stretch    1. Extend your arm in front of you with your palm up. 2. Bend your wrist, pointing your hand toward the floor. 3. With your other hand, gently bend your wrist farther until you feel a mild to moderate stretch in your forearm. 4. Hold for at least 15 to 30 seconds. Repeat 2 to 4 times. Wrist extensor stretch    1. Repeat steps 1 through 4 of the stretch above, but begin with your extended hand palm down. Follow-up care is a key part of your treatment and safety. Be sure to make and go to all appointments, and call your doctor if you are having problems. It's also a good idea to know your test results and keep a list of the medicines you take. Where can you learn more? Go to https://amanda.Sevcon. org and sign in to your [employer] account. Enter S542 in the [employer] box to learn more about \"Carpal Tunnel Syndrome: Exercises. \"     If you do not have an

## 2019-08-28 NOTE — LETTER
[address] [place] [address],[address] WALK-IN CARE  [address] [place] [address] [address] [address]  Phone: [phone]  Fax: [phone]    DEBBY Frank CNP        August 28, 2019     Patient: Juan José Leroy   YOB: 1980   Date of Visit: 8/28/2019       To Whom it May Concern:    Juan José Leroy was seen in my clinic on 8/28/2019. She may return to work on 8/29/19. She is to be excused for 8/27/19 and 8/28/19. If you have any questions or concerns, please don't hesitate to call.     Sincerely,         DEBBY Frank CNP

## 2019-08-28 NOTE — PROGRESS NOTES
08/28/19 1948   BP: 96/66   Site: Left Upper Arm   Position: Sitting   Cuff Size: Medium Adult   Pulse: 84   SpO2: 100%   Weight: 123 lb 6.4 oz (56 kg)       Physical Exam   Constitutional: She is oriented to person, place, and time. She appears well-developed and well-nourished. Cardiovascular: Normal rate, regular rhythm, normal heart sounds and intact distal pulses. Pulmonary/Chest: Effort normal and breath sounds normal. No respiratory distress. Abdominal: Soft. Bowel sounds are normal. She exhibits no distension. There is no tenderness. Musculoskeletal:        Right wrist: She exhibits decreased range of motion, tenderness and swelling. Left wrist: She exhibits decreased range of motion, tenderness and swelling. Neurological: She is alert and oriented to person, place, and time. Skin: Skin is warm and dry. Nursing note and vitals reviewed. Assessment & Plan: The following diagnoses and conditions are stable with no further orders unlessindicated:    1. Bilateral carpal tunnel syndrome        Prakash Larios was seen today for other. Diagnoses and all orders for this visit:    Bilateral carpal tunnel syndrome    -If possible, stop or reduce the activity that causes your symptoms. If you cannot stop the activity, take frequent breaks to rest and stretch or change hand positions to do a task. Try switching hands, such as when using a computer mouse.  -Try to avoid bending or twisting your wrists.  -Take an over-the-counter pain medicine, such as acetaminophen (Tylenol), ibuprofen (Advil, Motrin), or naproxen (Aleve). Be safe with medicines. Read and follow all instructions on the label.  -Put ice or a cold pack on your wrist for 10 to 20 minutes at a time to ease pain. Put a thin cloth between the ice and your skin.  -Wear a wrist splint, to keep your wrist in a neutral position. This also eases pressure on your median nerve.   -Try a yoga class to stretch your muscles and build strength in your hands and wrists. Yoga has been shown to ease carpal tunnel symptoms. To prevent carpal tunnel  -When working at a computer, keep your hands and wrists in line with your forearms. Hold your elbows close to your sides. Take a break every 10 to 15 minutes. -Try these exercises:  ? Warm up: Rotate your wrist up, down, and from side to side. Repeat this 4 times. Stretch your fingers far apart, relax them, then stretch them again. Repeat 4 times. Stretch your thumb by pulling it back gently, holding it, and then releasing it. Repeat 4 times. ? Prayer stretch: Start with your palms together in front of your chest just below your chin. Slowly lower your hands toward your waistline while keeping your hands close to your stomach and your palms together until you feel a mild to moderate stretch under your forearms. Hold for 10 to 20 seconds. Repeat 4 times. ? Wrist flexor stretch: Hold your arm in front of you with your palm up. Bend your wrist, pointing your hand toward the floor. With your other hand, gently bend your wrist further until you feel a mild to moderate stretch in your forearm. Hold for 10 to 20 seconds. Repeat 4 times. ? Wrist extensor stretch: Repeat the steps for the wrist flexor stretch, but begin with your extended hand palm down. ?   -Squeeze a rubber exercise ball several times a day to keep your hands and fingers strong.  -Avoid holding objects (such as a book) in one position for a long time. When possible, use your whole hand to grasp an object. Using just the thumb and index finger can put stress on the wrist.  -Do not smoke. It can make this condition worse by reducing blood flow to the median nerve. If you need help quitting, talk to your doctor about stop-smoking programs and medicines. These can increase your chances of quitting for good. Return if symptoms worsen or fail to improve. Care discussed with patient. Questions answered.  Patient verbalizes understanding and agrees with

## 2019-09-05 ENCOUNTER — HOSPITAL ENCOUNTER (OUTPATIENT)
Age: 39
Discharge: HOME | End: 2019-09-05
Payer: COMMERCIAL

## 2019-09-05 ENCOUNTER — HOSPITAL ENCOUNTER (OUTPATIENT)
Dept: GENERAL RADIOLOGY | Age: 39
Discharge: HOME | End: 2019-09-05
Payer: COMMERCIAL

## 2019-09-05 DIAGNOSIS — M54.50 CHRONIC MIDLINE LOW BACK PAIN WITHOUT SCIATICA: ICD-10-CM

## 2019-09-05 DIAGNOSIS — M21.611 BUNION OF GREAT TOE OF RIGHT FOOT: ICD-10-CM

## 2019-09-05 DIAGNOSIS — G89.29 CHRONIC MIDLINE LOW BACK PAIN WITHOUT SCIATICA: ICD-10-CM

## 2019-09-05 DIAGNOSIS — M21.612 BUNION OF GREAT TOE OF LEFT FOOT: ICD-10-CM

## 2019-09-05 PROCEDURE — 73660 X-RAY EXAM OF TOE(S): CPT

## 2019-09-05 PROCEDURE — 72100 X-RAY EXAM L-S SPINE 2/3 VWS: CPT

## 2020-04-06 RX ORDER — DULOXETIN HYDROCHLORIDE 60 MG/1
60 CAPSULE, DELAYED RELEASE ORAL 2 TIMES DAILY
Qty: 60 CAPSULE | Refills: 5 | Status: SHIPPED | OUTPATIENT
Start: 2020-04-06 | End: 2020-07-30 | Stop reason: SDUPTHER

## 2020-06-09 RX ORDER — TERBINAFINE HYDROCHLORIDE 250 MG/1
TABLET ORAL
Qty: 7 TABLET | Refills: 0 | Status: SHIPPED | OUTPATIENT
Start: 2020-06-09 | End: 2020-07-30

## 2020-06-09 RX ORDER — SUMATRIPTAN 100 MG/1
TABLET, FILM COATED ORAL
Qty: 9 TABLET | Refills: 0 | Status: SHIPPED | OUTPATIENT
Start: 2020-06-09 | End: 2020-07-30 | Stop reason: SDUPTHER

## 2020-06-12 ENCOUNTER — HOSPITAL ENCOUNTER (OUTPATIENT)
Dept: GENERAL RADIOLOGY | Age: 40
Discharge: HOME | End: 2020-06-12
Payer: COMMERCIAL

## 2020-06-12 ENCOUNTER — HOSPITAL ENCOUNTER (OUTPATIENT)
Age: 40
Discharge: HOME | End: 2020-06-12
Payer: COMMERCIAL

## 2020-06-12 PROCEDURE — 73630 X-RAY EXAM OF FOOT: CPT

## 2020-07-30 ENCOUNTER — OFFICE VISIT (OUTPATIENT)
Dept: FAMILY MEDICINE CLINIC | Age: 40
End: 2020-07-30
Payer: COMMERCIAL

## 2020-07-30 VITALS
BODY MASS INDEX: 24 KG/M2 | OXYGEN SATURATION: 97 % | DIASTOLIC BLOOD PRESSURE: 60 MMHG | HEART RATE: 83 BPM | SYSTOLIC BLOOD PRESSURE: 100 MMHG | WEIGHT: 127 LBS

## 2020-07-30 PROCEDURE — 1036F TOBACCO NON-USER: CPT | Performed by: FAMILY MEDICINE

## 2020-07-30 PROCEDURE — G8427 DOCREV CUR MEDS BY ELIG CLIN: HCPCS | Performed by: FAMILY MEDICINE

## 2020-07-30 PROCEDURE — 99214 OFFICE O/P EST MOD 30 MIN: CPT | Performed by: FAMILY MEDICINE

## 2020-07-30 PROCEDURE — 90715 TDAP VACCINE 7 YRS/> IM: CPT | Performed by: FAMILY MEDICINE

## 2020-07-30 PROCEDURE — G8420 CALC BMI NORM PARAMETERS: HCPCS | Performed by: FAMILY MEDICINE

## 2020-07-30 PROCEDURE — 90471 IMMUNIZATION ADMIN: CPT | Performed by: FAMILY MEDICINE

## 2020-07-30 RX ORDER — DULOXETIN HYDROCHLORIDE 60 MG/1
60 CAPSULE, DELAYED RELEASE ORAL 2 TIMES DAILY
Qty: 60 CAPSULE | Refills: 12 | Status: SHIPPED | OUTPATIENT
Start: 2020-07-30 | End: 2021-09-17

## 2020-07-30 RX ORDER — METHOCARBAMOL 500 MG/1
500 TABLET, FILM COATED ORAL 4 TIMES DAILY
Qty: 40 TABLET | Refills: 2 | Status: SHIPPED | OUTPATIENT
Start: 2020-07-30 | End: 2021-09-17

## 2020-07-30 RX ORDER — ESTRADIOL 1 MG/1
TABLET ORAL
Qty: 45 TABLET | Refills: 12 | Status: SHIPPED | OUTPATIENT
Start: 2020-07-30 | End: 2021-08-09

## 2020-07-30 RX ORDER — SUMATRIPTAN 100 MG/1
TABLET, FILM COATED ORAL
Qty: 9 TABLET | Refills: 12 | Status: SHIPPED | OUTPATIENT
Start: 2020-07-30 | End: 2021-12-22

## 2020-07-30 RX ORDER — DOXYCYCLINE 100 MG/1
100 CAPSULE ORAL 2 TIMES DAILY
COMMUNITY
Start: 2020-07-10 | End: 2020-07-30

## 2020-07-30 NOTE — PROGRESS NOTES
Chief Complaint   Patient presents with    Depression     cymlata bid was helpful for body pain and for mood-- she would like to continue on this medication    Chronic Pain     cymbalta also helpful managing her fibromyalgia pain    Foot Pain     She has bilateral bunions which are becoming more painful as time goes along; both medial feet and her shoes are deformed       [place] NARRATIVE:    Last seen by me about 13 months ago. Mood problems have been biggest issue for us to work on, she also had CTS and fibromyalgia. CTS pain is really unchanged but she is managing currently. 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 Patient is established unless otherwise noted. Above chief complaint and [place] obtained by this physician provider. Review of Systems   Constitutional: Negative for activity change, chills, fatigue and fever. HENT: Negative for congestion. Respiratory: Negative for cough, chest tightness, shortness of breath and wheezing. Cardiovascular: Negative for chest pain and leg swelling. Gastrointestinal: Negative for abdominal pain. Musculoskeletal: Positive for arthralgias (bilateral foot pain). Negative for back pain and myalgias (frequent muscle aching). Skin: Negative for rash. Psychiatric/Behavioral: Negative for behavioral problems and dysphoric mood. Allergies   Allergen Reactions    Benadryl [Diphenhydramine Hcl] Other (See Comments)     Shaking and itching    Pseudoeph-Doxylamine-Dm-Apap Other (See Comments)     Shaking and itching      Bactrim [Sulfamethoxazole-Trimethoprim] Nausea And Vomiting     Allergy historyupdated.     Outpatient Medications Marked as Taking for the 7/30/20 encounter (Office Visit) with Gwinda Kehr, MD   Medication Sig Dispense Refill    methocarbamol (ROBAXIN) 500 MG tablet Take 1 tablet by mouth 4 times daily 40 tablet 2    DULoxetine (CYMBALTA) 60 MG extended release capsule Take 1 capsule by mouth 2 times daily 60 capsule 12    estradiol (ESTRACE) 1 MG tablet TAKE 1 & 1/2 (ONE & ONE-HALF) TABLETS BY MOUTH ONCE DAILY 45 tablet 12    naproxen (NAPROXEN) 500 MG EC tablet Take 1 tablet by mouth 2 times daily as needed for Pain (with food) 60 tablet 12    SUMAtriptan (IMITREX) 100 MG tablet TAKE 1/2 (ONE-HALF) TABLET BY MOUTH ONCE AS NEEDED FOR MIGRAINE. 9 tablet 12       Tobacco use history updated. Social History     Tobacco Use   Smoking Status Former Smoker   Smokeless Tobacco Never Used        Nursing note reviewed. Vitals:    07/30/20 1340   BP: 100/60   Site: Left Upper Arm   Position: Sitting   Cuff Size: Medium Adult   Pulse: 83   SpO2: 97%   Weight: 127 lb (57.6 kg)          BP Readings from Last 3 Encounters:   07/30/20 100/60   08/28/19 96/66   08/23/19 102/64     Wt Readings from Last 3 Encounters:   07/30/20 127 lb (57.6 kg)   08/28/19 123 lb 6.4 oz (56 kg)   08/23/19 125 lb 9.6 oz (57 kg)     Body mass index is 24 kg/m². No results found for this visit on 07/30/20. Physical Exam  Vitals signs and nursing note reviewed. Constitutional:       General: She is not in acute distress. Appearance: She is well-developed. She is not diaphoretic. HENT:      Head: Normocephalic. Eyes:      General:         Right eye: No discharge. Left eye: No discharge. Conjunctiva/sclera: Conjunctivae normal.      Pupils: Pupils are equal, round, and reactive to light. Neck:      Musculoskeletal: Normal range of motion. Cardiovascular:      Rate and Rhythm: Normal rate and regular rhythm. Heart sounds: Normal heart sounds. No murmur. Pulmonary:      Effort: Pulmonary effort is normal. No respiratory distress. Breath sounds: Normal breath sounds. No wheezing or rales. Musculoskeletal:         General: Deformity (bilateral severe bunions obvious even with shoes ON) present. Skin:     General: Skin is warm and dry.    Neurological:      Mental Status: She is alert and

## 2020-08-09 ASSESSMENT — ENCOUNTER SYMPTOMS
CHEST TIGHTNESS: 0
ABDOMINAL PAIN: 0
WHEEZING: 0
COUGH: 0
SHORTNESS OF BREATH: 0
BACK PAIN: 0

## 2020-09-09 ENCOUNTER — HOSPITAL ENCOUNTER (OUTPATIENT)
Age: 40
Setting detail: SPECIMEN
Discharge: HOME | End: 2020-09-09
Payer: COMMERCIAL

## 2020-09-09 ENCOUNTER — OFFICE VISIT (OUTPATIENT)
Dept: PRIMARY CARE CLINIC | Age: 40
End: 2020-09-09
Payer: COMMERCIAL

## 2020-09-09 VITALS — HEART RATE: 93 BPM | OXYGEN SATURATION: 99 % | TEMPERATURE: 97.1 F

## 2020-09-09 PROCEDURE — 1036F TOBACCO NON-USER: CPT | Performed by: FAMILY MEDICINE

## 2020-09-09 PROCEDURE — U0002 COVID-19 LAB TEST NON-CDC: HCPCS

## 2020-09-09 PROCEDURE — G8428 CUR MEDS NOT DOCUMENT: HCPCS | Performed by: FAMILY MEDICINE

## 2020-09-09 PROCEDURE — 99213 OFFICE O/P EST LOW 20 MIN: CPT | Performed by: FAMILY MEDICINE

## 2020-09-09 PROCEDURE — G8420 CALC BMI NORM PARAMETERS: HCPCS | Performed by: FAMILY MEDICINE

## 2020-09-09 NOTE — PROGRESS NOTES
9/9/2020    HPI:  Chief complaint and history of present illness as per medical assistant/nurse documented today in the Flu/COVID-19 clinic. MEDICATIONS:  Prior to Visit Medications    Medication Sig Taking? Authorizing Provider   DULoxetine (CYMBALTA) 60 MG extended release capsule Take 1 capsule by mouth 2 times daily  Marilyn Clark MD   estradiol (ESTRACE) 1 MG tablet TAKE 1 & 1/2 (ONE & ONE-HALF) TABLETS BY MOUTH ONCE DAILY  Marilyn Clark MD   naproxen (NAPROXEN) 500 MG EC tablet Take 1 tablet by mouth 2 times daily as needed for Pain (with food)  Marilyn Clark MD   SUMAtriptan (IMITREX) 100 MG tablet TAKE 1/2 (ONE-HALF) TABLET BY MOUTH ONCE AS NEEDED FOR MIGRAINE. Marilyn Clark MD       Allergies   Allergen Reactions    Benadryl [Diphenhydramine Hcl] Other (See Comments)     Shaking and itching    Pseudoeph-Doxylamine-Dm-Apap Other (See Comments)     Shaking and itching      Bactrim [Sulfamethoxazole-Trimethoprim] Nausea And Vomiting   ,   Past Medical History:   Diagnosis Date    Endometriosis     Migraines    ,   Past Surgical History:   Procedure Laterality Date    ABDOMEN SURGERY      endometriosis    HYSTERECTOMY     ,   Social History     Tobacco Use    Smoking status: Former Smoker    Smokeless tobacco: Never Used   Substance Use Topics    Alcohol use: Yes     Comment: social    Drug use: No       PHYSICAL EXAM:  Physical Exam  Vitals signs and nursing note reviewed. Constitutional:       Appearance: Normal appearance. She is normal weight. HENT:      Head: Normocephalic and atraumatic. Right Ear: Tympanic membrane, ear canal and external ear normal.      Left Ear: Tympanic membrane, ear canal and external ear normal.      Nose: Congestion present. Mouth/Throat:      Mouth: Mucous membranes are moist.      Pharynx: Oropharynx is clear. Eyes:      Extraocular Movements: Extraocular movements intact.       Conjunctiva/sclera: Conjunctivae normal.      Pupils: Pupils are equal,

## 2020-09-09 NOTE — PATIENT INSTRUCTIONS
Your COVID 19 test can take 3-5 days for the results come back. We ask that you make a Mychart page and view your test results this way. You will need to Self quarantine until you know your results. Increase fluids rest  Saline nasal spray as directed  Warm salt gargles for throat discomfort  Monitor temperature twice a day  Tylenol for fevers and/or discomfort. If symptoms are worse -Go to the ER. Follow up with your primary doctor    To Whom it May Concern:    Xiomara Golden has been tested for COVID on 09/09/20. They may NOT return to work until their lab test results back and they been fever free for 3 days. If test is positive they must stay home for 2 weeks or until they test negative or as directed by the [facility] Department.

## 2020-09-09 NOTE — PROGRESS NOTES
9/9/20  Maddie Jada  1980    FLU/COVID-19 CLINIC EVALUATION    HPI SYMPTOMS:    Employer:    [] Fevers  [] Chills  [] Cough  [] Coughing up blood  [] Chest Congestion  [x] Nasal Congestion  [] Feeling short of breath  [] Sometimes  [] Frequently  [] All the time  [] Chest pain  [] Headaches  []Tolerable  [] Severe  [x] Sore throat  [x] Muscle aches  [] Nausea  [] Vomiting  []Unable to keep fluids down  [] Diarrhea  []Severe    [] OTHER SYMPTOMS: fatigue, loss of taste      Symptom Duration:   [] 1  [] 2   [] 3   [] 4    [] 5   [] 6   [x] 7   [] 8   [] 9   [] 10   [] 11   [] 12   [] 13   [] 14   [] Longer than 14 days    Symptom course:   [] Worsening     [x] Stable     [] Improving    RISK FACTORS:    [] Pregnant or possibly pregnant  [] Age over 61  [] Diabetes  [] Heart disease  [] Asthma  [] COPD/Other chronic lung diseases  [] Active Cancer  [] On Chemotherapy  [] Taking oral steroids  [] History Lymphoma/Leukemia  [x] Close contact with a lab confirmed COVID-19 patient within 14 days of symptom onset (co-worker)  [] History of travel from affected geographical areas within 14 days of symptom onset       VITALS:  There were no vitals filed for this visit. TESTS:    POCT FLU:  [] Positive     []Negative    ASSESSMENT:    [] Flu  [] Possible COVID-19  [] Strep    PLAN:    [] Discharge home with written instructions for:  [] Flu management  [] Possible COVID-19 infection with self-quarantine and management of symptoms  [] Follow-up with primary care physician or emergency department if worsens  [] Evaluation per physician/nurse practitioner in clinic  [] Sent to ER       An  electronic signature was used to authenticate this note.      --Cali Meyers MA on 9/9/2020 at 10:50 AM

## 2020-09-10 LAB
SARS-COV-2: NOT DETECTED
SOURCE: NORMAL

## 2021-07-27 ENCOUNTER — OFFICE VISIT (OUTPATIENT)
Dept: FAMILY MEDICINE CLINIC | Age: 41
End: 2021-07-27
Payer: COMMERCIAL

## 2021-07-27 VITALS
HEART RATE: 81 BPM | SYSTOLIC BLOOD PRESSURE: 102 MMHG | OXYGEN SATURATION: 95 % | DIASTOLIC BLOOD PRESSURE: 68 MMHG | WEIGHT: 149 LBS | BODY MASS INDEX: 28.13 KG/M2 | HEIGHT: 61 IN

## 2021-07-27 DIAGNOSIS — R53.83 FATIGUE, UNSPECIFIED TYPE: ICD-10-CM

## 2021-07-27 DIAGNOSIS — Z83.49 FAMILY HISTORY OF THYROID DISORDER: ICD-10-CM

## 2021-07-27 DIAGNOSIS — R63.5 WEIGHT GAIN: Primary | ICD-10-CM

## 2021-07-27 LAB
A/G RATIO: 1.6 (ref 1.1–2.2)
ALBUMIN SERPL-MCNC: 4.2 G/DL (ref 3.4–5)
ALP BLD-CCNC: 85 U/L (ref 40–129)
ALT SERPL-CCNC: 18 U/L (ref 10–40)
ANION GAP SERPL CALCULATED.3IONS-SCNC: 12 MMOL/L (ref 3–16)
AST SERPL-CCNC: 16 U/L (ref 15–37)
BILIRUB SERPL-MCNC: <0.2 MG/DL (ref 0–1)
BUN BLDV-MCNC: 14 MG/DL (ref 7–20)
CALCIUM SERPL-MCNC: 9.4 MG/DL (ref 8.3–10.6)
CHLORIDE BLD-SCNC: 101 MMOL/L (ref 99–110)
CO2: 25 MMOL/L (ref 21–32)
CREAT SERPL-MCNC: 0.7 MG/DL (ref 0.6–1.1)
GFR AFRICAN AMERICAN: >60
GFR NON-AFRICAN AMERICAN: >60
GLOBULIN: 2.7 G/DL
GLUCOSE BLD-MCNC: 83 MG/DL (ref 70–99)
HCT VFR BLD CALC: 35 % (ref 36–48)
HEMOGLOBIN: 12 G/DL (ref 12–16)
MCH RBC QN AUTO: 28.4 PG (ref 26–34)
MCHC RBC AUTO-ENTMCNC: 34.2 G/DL (ref 31–36)
MCV RBC AUTO: 83.2 FL (ref 80–100)
PDW BLD-RTO: 12.9 % (ref 12.4–15.4)
PLATELET # BLD: 253 K/UL (ref 135–450)
PMV BLD AUTO: 8.3 FL (ref 5–10.5)
POTASSIUM SERPL-SCNC: 4.3 MMOL/L (ref 3.5–5.1)
RBC # BLD: 4.21 M/UL (ref 4–5.2)
SODIUM BLD-SCNC: 138 MMOL/L (ref 136–145)
TOTAL PROTEIN: 6.9 G/DL (ref 6.4–8.2)
TSH SERPL DL<=0.05 MIU/L-ACNC: 2.37 UIU/ML (ref 0.27–4.2)
WBC # BLD: 7.6 K/UL (ref 4–11)

## 2021-07-27 PROCEDURE — 36415 COLL VENOUS BLD VENIPUNCTURE: CPT | Performed by: FAMILY MEDICINE

## 2021-07-27 PROCEDURE — 1036F TOBACCO NON-USER: CPT | Performed by: FAMILY MEDICINE

## 2021-07-27 PROCEDURE — G8427 DOCREV CUR MEDS BY ELIG CLIN: HCPCS | Performed by: FAMILY MEDICINE

## 2021-07-27 PROCEDURE — 99213 OFFICE O/P EST LOW 20 MIN: CPT | Performed by: FAMILY MEDICINE

## 2021-07-27 PROCEDURE — G8419 CALC BMI OUT NRM PARAM NOF/U: HCPCS | Performed by: FAMILY MEDICINE

## 2021-07-27 NOTE — PROGRESS NOTES
Chief Complaint   Patient presents with    Fatigue     pt reports not having any energy and states she has gained weight. pt concerned about thyroid as she has a family hx of thyroid issues     Foot Swelling     Intermittent foot swelling, worse previously but still present intermittently.; Weight is also increased. Blood pressure is good       [place] NARRATIVE:    Daytime sleepiness and low energy. 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 Weight up 22# in last year. Does not eat take out--eats from home. Family history of thyroid disease. Reports no periods--had complete hysterectomy years ago. Reports knee and hand pain, occasional puffiness of legs--mainly feet and ankle. Worse when she was working thrid shift. Patient is established unless otherwise noted. Above chief complaint and [place] obtained by this physician provider. Review of Systems   Constitutional: Positive for fatigue. Negative for activity change, chills and fever. HENT: Negative for congestion. Respiratory: Negative for cough, chest tightness, shortness of breath and wheezing. Cardiovascular: Positive for leg swelling. Negative for chest pain. Gastrointestinal: Negative for abdominal pain. Musculoskeletal: Negative for back pain and myalgias. Skin: Negative for rash. Psychiatric/Behavioral: Negative for behavioral problems and dysphoric mood. Allergies   Allergen Reactions    Benadryl [Diphenhydramine Hcl] Other (See Comments)     Shaking and itching    Pseudoeph-Doxylamine-Dm-Apap Other (See Comments)     Shaking and itching      Bactrim [Sulfamethoxazole-Trimethoprim] Nausea And Vomiting     Allergy historyupdated.     Outpatient Medications Marked as Taking for the 7/27/21 encounter (Office Visit) with Rohini Garcia MD   Medication Sig Dispense Refill    DULoxetine (CYMBALTA) 60 MG extended release capsule Take 1 capsule by mouth 2 times daily 60 capsule 12    SUMAtriptan (IMITREX) 100 MG tablet TAKE 1/2 (ONE-HALF) TABLET BY MOUTH ONCE AS NEEDED FOR MIGRAINE. 9 tablet 12    [DISCONTINUED] estradiol (ESTRACE) 1 MG tablet TAKE 1 & 1/2 (ONE & ONE-HALF) TABLETS BY MOUTH ONCE DAILY 45 tablet 12    [DISCONTINUED] naproxen (NAPROXEN) 500 MG EC tablet Take 1 tablet by mouth 2 times daily as needed for Pain (with food) 60 tablet 12       Tobacco use history updated. Social History     Tobacco Use   Smoking Status Former Smoker   Smokeless Tobacco Never Used        Nursing note reviewed. Vitals:    07/27/21 1336   BP: 102/68   Site: Left Upper Arm   Position: Sitting   Cuff Size: Medium Adult   Pulse: 81   SpO2: 95%   Weight: 149 lb (67.6 kg)   Height: 5' 1\" (1.549 m)          BP Readings from Last 3 Encounters:   07/27/21 102/68   07/30/20 100/60   08/28/19 96/66     Wt Readings from Last 3 Encounters:   07/27/21 149 lb (67.6 kg)   07/30/20 127 lb (57.6 kg)   08/28/19 123 lb 6.4 oz (56 kg)     Body mass index is 28.15 kg/m². No results found for this visit on 07/27/21. Physical Exam  Vitals and nursing note reviewed. Constitutional:       General: She is not in acute distress. Appearance: She is well-developed. She is not diaphoretic. HENT:      Head: Normocephalic. Eyes:      General:         Right eye: No discharge. Left eye: No discharge. Conjunctiva/sclera: Conjunctivae normal.      Pupils: Pupils are equal, round, and reactive to light. Cardiovascular:      Rate and Rhythm: Normal rate and regular rhythm. Heart sounds: Normal heart sounds. No murmur heard. Pulmonary:      Effort: Pulmonary effort is normal. No respiratory distress. Breath sounds: Normal breath sounds. No wheezing or rales. Musculoskeletal:      Cervical back: Normal range of motion. Skin:     General: Skin is warm and dry. Neurological:      Mental Status: She is alert and oriented to person, place, and time.    Psychiatric:         Behavior: Behavior normal. _________________________________________________  Assessment:     1. Weight gain  -     TSH without Reflex  -     COMPREHENSIVE METABOLIC PANEL  2. Fatigue, unspecified type  -     TSH without Reflex  -     CBC  -     COMPREHENSIVE METABOLIC PANEL  3. Family history of thyroid disorder    We will check labs as above for weight gain and fatigue. It is possible that her work environment changing and staying at home and working third shift has added to her fatigue and her weight gain problem. If testing is negative then maintenance of proper sleep schedule if able, healthy eating and increasing exercise are all recommended. Problems listed above are stable and therapeutic plan is unchanged unless otherwise specified. See orders above and comments below for details of workup or medication orders. _________________________________________________  Plan:     Return if symptoms worsen or fail to improve, for [place] as scheduled.       Electronically signed by Parish Garrett MD on 7/27/21 at 1:47 PM EDT

## 2021-08-05 DIAGNOSIS — M21.611 BUNION OF GREAT TOE OF RIGHT FOOT: ICD-10-CM

## 2021-08-05 DIAGNOSIS — M21.612 BUNION OF GREAT TOE OF LEFT FOOT: ICD-10-CM

## 2021-08-05 DIAGNOSIS — N95.1 MENOPAUSAL SYNDROME: ICD-10-CM

## 2021-08-09 RX ORDER — NAPROXEN 500 MG/1
TABLET, DELAYED RELEASE ORAL
Qty: 60 TABLET | Refills: 1 | Status: SHIPPED | OUTPATIENT
Start: 2021-08-09 | End: 2021-12-22

## 2021-08-09 RX ORDER — ESTRADIOL 1 MG/1
TABLET ORAL
Qty: 45 TABLET | Refills: 5 | Status: SHIPPED | OUTPATIENT
Start: 2021-08-09 | End: 2022-05-18 | Stop reason: SDUPTHER

## 2021-08-13 NOTE — RESULT ENCOUNTER NOTE
These results from late last month show normal thyroid function and normal metabolic panel with normal electrolytes, kidney and liver function and sugar. Blood count is essentially normal. Hematocrit is marginally below normal but this was present previously and is not a sign of disease or for concern.     Not routed for staff call but to my chart only

## 2021-08-15 ASSESSMENT — ENCOUNTER SYMPTOMS
WHEEZING: 0
ABDOMINAL PAIN: 0
SHORTNESS OF BREATH: 0
CHEST TIGHTNESS: 0
COUGH: 0
BACK PAIN: 0

## 2021-09-17 DIAGNOSIS — F33.41 RECURRENT MAJOR DEPRESSIVE DISORDER, IN PARTIAL REMISSION (HCC): ICD-10-CM

## 2021-09-17 RX ORDER — DULOXETIN HYDROCHLORIDE 60 MG/1
CAPSULE, DELAYED RELEASE ORAL
Qty: 60 CAPSULE | Refills: 2 | Status: SHIPPED | OUTPATIENT
Start: 2021-09-17 | End: 2022-02-07

## 2021-09-17 RX ORDER — METHOCARBAMOL 500 MG/1
500 TABLET, FILM COATED ORAL 4 TIMES DAILY
Qty: 40 TABLET | Refills: 2 | Status: SHIPPED | OUTPATIENT
Start: 2021-09-17 | End: 2021-10-17

## 2021-12-21 DIAGNOSIS — M21.611 BUNION OF GREAT TOE OF RIGHT FOOT: ICD-10-CM

## 2021-12-21 DIAGNOSIS — G43.809 OTHER MIGRAINE WITHOUT STATUS MIGRAINOSUS, NOT INTRACTABLE: ICD-10-CM

## 2021-12-21 DIAGNOSIS — M21.612 BUNION OF GREAT TOE OF LEFT FOOT: ICD-10-CM

## 2021-12-22 RX ORDER — SUMATRIPTAN 100 MG/1
TABLET, FILM COATED ORAL
Qty: 9 TABLET | Refills: 2 | Status: SHIPPED | OUTPATIENT
Start: 2021-12-22 | End: 2022-05-19 | Stop reason: SDUPTHER

## 2021-12-22 RX ORDER — NAPROXEN 500 MG/1
TABLET, DELAYED RELEASE ORAL
Qty: 60 TABLET | Refills: 2 | Status: SHIPPED | OUTPATIENT
Start: 2021-12-22 | End: 2022-05-19 | Stop reason: SDUPTHER

## 2021-12-29 ENCOUNTER — TELEPHONE (OUTPATIENT)
Dept: FAMILY MEDICINE CLINIC | Age: 41
End: 2021-12-29

## 2022-02-05 DIAGNOSIS — F33.41 RECURRENT MAJOR DEPRESSIVE DISORDER, IN PARTIAL REMISSION (HCC): ICD-10-CM

## 2022-02-07 RX ORDER — DULOXETIN HYDROCHLORIDE 60 MG/1
CAPSULE, DELAYED RELEASE ORAL
Qty: 60 CAPSULE | Refills: 2 | Status: SHIPPED | OUTPATIENT
Start: 2022-02-07 | End: 2022-05-19 | Stop reason: SDUPTHER

## 2022-05-18 DIAGNOSIS — N95.1 MENOPAUSAL SYNDROME: ICD-10-CM

## 2022-05-18 RX ORDER — ESTRADIOL 1 MG/1
TABLET ORAL
Qty: 45 TABLET | Refills: 2 | Status: SHIPPED | OUTPATIENT
Start: 2022-05-18 | End: 2022-09-29

## 2022-05-19 ENCOUNTER — OFFICE VISIT (OUTPATIENT)
Dept: FAMILY MEDICINE CLINIC | Age: 42
End: 2022-05-19
Payer: COMMERCIAL

## 2022-05-19 VITALS
WEIGHT: 149 LBS | OXYGEN SATURATION: 97 % | HEART RATE: 99 BPM | BODY MASS INDEX: 28.15 KG/M2 | SYSTOLIC BLOOD PRESSURE: 110 MMHG | DIASTOLIC BLOOD PRESSURE: 70 MMHG

## 2022-05-19 DIAGNOSIS — M21.611 BUNION OF GREAT TOE OF RIGHT FOOT: ICD-10-CM

## 2022-05-19 DIAGNOSIS — F33.41 RECURRENT MAJOR DEPRESSIVE DISORDER, IN PARTIAL REMISSION (HCC): ICD-10-CM

## 2022-05-19 DIAGNOSIS — M21.612 BUNION OF GREAT TOE OF LEFT FOOT: ICD-10-CM

## 2022-05-19 DIAGNOSIS — G43.809 OTHER MIGRAINE WITHOUT STATUS MIGRAINOSUS, NOT INTRACTABLE: ICD-10-CM

## 2022-05-19 PROCEDURE — 1036F TOBACCO NON-USER: CPT | Performed by: FAMILY MEDICINE

## 2022-05-19 PROCEDURE — G8427 DOCREV CUR MEDS BY ELIG CLIN: HCPCS | Performed by: FAMILY MEDICINE

## 2022-05-19 PROCEDURE — G8419 CALC BMI OUT NRM PARAM NOF/U: HCPCS | Performed by: FAMILY MEDICINE

## 2022-05-19 PROCEDURE — 99214 OFFICE O/P EST MOD 30 MIN: CPT | Performed by: FAMILY MEDICINE

## 2022-05-19 RX ORDER — SUMATRIPTAN 100 MG/1
TABLET, FILM COATED ORAL
Qty: 9 TABLET | Refills: 2 | Status: SHIPPED | OUTPATIENT
Start: 2022-05-19

## 2022-05-19 RX ORDER — DULOXETIN HYDROCHLORIDE 60 MG/1
60 CAPSULE, DELAYED RELEASE ORAL 2 TIMES DAILY
Qty: 30 CAPSULE | Refills: 5 | Status: SHIPPED | OUTPATIENT
Start: 2022-05-19

## 2022-05-19 RX ORDER — NAPROXEN 500 MG/1
TABLET ORAL
Qty: 60 TABLET | Refills: 5 | Status: SHIPPED | OUTPATIENT
Start: 2022-05-19

## 2022-05-19 ASSESSMENT — PATIENT HEALTH QUESTIONNAIRE - PHQ9
8. MOVING OR SPEAKING SO SLOWLY THAT OTHER PEOPLE COULD HAVE NOTICED. OR THE OPPOSITE, BEING SO FIGETY OR RESTLESS THAT YOU HAVE BEEN MOVING AROUND A LOT MORE THAN USUAL: 0
4. FEELING TIRED OR HAVING LITTLE ENERGY: 3
2. FEELING DOWN, DEPRESSED OR HOPELESS: 0
5. POOR APPETITE OR OVEREATING: 0
7. TROUBLE CONCENTRATING ON THINGS, SUCH AS READING THE NEWSPAPER OR WATCHING TELEVISION: 0
10. IF YOU CHECKED OFF ANY PROBLEMS, HOW DIFFICULT HAVE THESE PROBLEMS MADE IT FOR YOU TO DO YOUR WORK, TAKE CARE OF THINGS AT HOME, OR GET ALONG WITH OTHER PEOPLE: 1
3. TROUBLE FALLING OR STAYING ASLEEP: 0
SUM OF ALL RESPONSES TO PHQ QUESTIONS 1-9: 3
SUM OF ALL RESPONSES TO PHQ9 QUESTIONS 1 & 2: 0
SUM OF ALL RESPONSES TO PHQ QUESTIONS 1-9: 3
6. FEELING BAD ABOUT YOURSELF - OR THAT YOU ARE A FAILURE OR HAVE LET YOURSELF OR YOUR FAMILY DOWN: 0
SUM OF ALL RESPONSES TO PHQ QUESTIONS 1-9: 3
SUM OF ALL RESPONSES TO PHQ QUESTIONS 1-9: 3
9. THOUGHTS THAT YOU WOULD BE BETTER OFF DEAD, OR OF HURTING YOURSELF: 0
1. LITTLE INTEREST OR PLEASURE IN DOING THINGS: 0

## 2022-05-19 NOTE — PROGRESS NOTES
Chief Complaint   Patient presents with    Medication Refill    Weight Gain     pt would like to discuss weight loss options        [place] NARRATIVE:    Patient is established unless otherwise noted. She has known mood issues and also history of migraines. She has bilateral bunions and is not sure she wants to proceed with podiatry evaluation and possible surgery or weight. They are only intermittently painful. Above chief complaint and [place] obtained by this physician provider. Review of Systems   Constitutional: Positive for unexpected weight change. Negative for activity change, chills, fatigue and fever. HENT: Negative for congestion. Respiratory: Negative for cough, chest tightness, shortness of breath and wheezing. Cardiovascular: Negative for chest pain and leg swelling. Gastrointestinal: Negative for abdominal pain. Musculoskeletal: Positive for arthralgias. Negative for back pain and myalgias. Skin: Negative for rash. Psychiatric/Behavioral: Negative for behavioral problems and dysphoric mood. Allergies   Allergen Reactions    Benadryl [Diphenhydramine Hcl] Other (See Comments)     Shaking and itching    Pseudoeph-Doxylamine-Dm-Apap Other (See Comments)     Shaking and itching      Bactrim [Sulfamethoxazole-Trimethoprim] Nausea And Vomiting     Allergy historyupdated.     Outpatient Medications Marked as Taking for the 5/19/22 encounter (Office Visit) with Parish Garrett MD   Medication Sig Dispense Refill    DULoxetine (CYMBALTA) 60 MG extended release capsule Take 1 capsule by mouth 2 times daily 30 capsule 5    SUMAtriptan (IMITREX) 100 MG tablet TAKE 1/2 (ONE-HALF) TABLET BY MOUTH AS NEEDED FOR MIGRAINE HEADACHE 9 tablet 2    naproxen (EC-NAPROXEN) 500 MG EC tablet TAKE 1 TABLET BY MOUTH TWICE DAILY AS NEEDED WITH FOOD FOR PAIN 60 tablet 5    estradiol (ESTRACE) 1 MG tablet Take 1-1/2 tablets by mouth once daily (Patient taking differently: 1 mg Take 1-1/2 tablets by mouth once daily) 45 tablet 2       Tobacco use history updated. Social History     Tobacco Use   Smoking Status Former Smoker   Smokeless Tobacco Never Used        Nursing note reviewed. Vitals:    05/19/22 1309   BP: 110/70   Site: Left Upper Arm   Position: Sitting   Cuff Size: Medium Adult   Pulse: 99   SpO2: 97%   Weight: 149 lb (67.6 kg)          BP Readings from Last 3 Encounters:   05/19/22 110/70   07/27/21 102/68   07/30/20 100/60     Wt Readings from Last 3 Encounters:   05/19/22 149 lb (67.6 kg)   07/27/21 149 lb (67.6 kg)   07/30/20 127 lb (57.6 kg)     Body mass index is 28.15 kg/m². No results found for this visit on 05/19/22. Physical Exam  Vitals and nursing note reviewed. Constitutional:       General: She is not in acute distress. Appearance: She is well-developed. She is not diaphoretic. HENT:      Head: Normocephalic. Eyes:      General:         Right eye: No discharge. Left eye: No discharge. Conjunctiva/sclera: Conjunctivae normal.      Pupils: Pupils are equal, round, and reactive to light. Cardiovascular:      Rate and Rhythm: Normal rate and regular rhythm. Heart sounds: Normal heart sounds. No murmur heard. Pulmonary:      Effort: Pulmonary effort is normal. No respiratory distress. Breath sounds: Normal breath sounds. No wheezing or rales. Musculoskeletal:         General: Deformity (Bilateral hallux valgus deviation) present. Cervical back: Normal range of motion. Skin:     General: Skin is warm and dry. Neurological:      Mental Status: She is alert and oriented to person, place, and time. Psychiatric:         Behavior: Behavior normal.           _________________________________________________  Assessment:     1. Recurrent major depressive disorder, in partial remission (HCC)  -     DULoxetine (CYMBALTA) 60 MG extended release capsule; Take 1 capsule by mouth 2 times daily, Disp-30 capsule, R-5Normal  2.  Other migraine without status migrainosus, not intractable  -     SUMAtriptan (IMITREX) 100 MG tablet; TAKE 1/2 (ONE-HALF) TABLET BY MOUTH AS NEEDED FOR MIGRAINE HEADACHE, Disp-9 tablet, R-2Normal  3. Bunion of great toe of left foot  -     naproxen (EC-NAPROXEN) 500 MG EC tablet; TAKE 1 TABLET BY MOUTH TWICE DAILY AS NEEDED WITH FOOD FOR PAIN, Disp-60 tablet, R-5Normal  4. Bunion of great toe of right foot  -     naproxen (EC-NAPROXEN) 500 MG EC tablet; TAKE 1 TABLET BY MOUTH TWICE DAILY AS NEEDED WITH FOOD FOR PAIN, Disp-60 tablet, R-5Normal    P trial of Naprosyn. Duloxetine as above. Imitrex refilled for headache. Roblems listed above are stable and therapeutic plan is unchanged unless otherwise specified. See orders above and comments below for details of workup or medication orders. _________________________________________________  Plan:     Return if symptoms worsen or fail to improve, for or annually as she prefers.       Electronically signed by Nica Seymour MD on 5/19/22 at 1:16 PM EDT

## 2022-05-31 ASSESSMENT — ENCOUNTER SYMPTOMS
ABDOMINAL PAIN: 0
WHEEZING: 0
SHORTNESS OF BREATH: 0
BACK PAIN: 0
CHEST TIGHTNESS: 0
COUGH: 0

## 2022-09-29 DIAGNOSIS — N95.1 MENOPAUSAL SYNDROME: ICD-10-CM

## 2022-09-29 RX ORDER — ESTRADIOL 1 MG/1
TABLET ORAL
Qty: 45 TABLET | Refills: 5 | Status: SHIPPED | OUTPATIENT
Start: 2022-09-29

## 2023-02-07 ENCOUNTER — PATIENT MESSAGE (OUTPATIENT)
Dept: FAMILY MEDICINE CLINIC | Age: 43
End: 2023-02-07

## 2023-02-07 DIAGNOSIS — M21.611 BUNION OF GREAT TOE OF RIGHT FOOT: ICD-10-CM

## 2023-02-07 DIAGNOSIS — M21.612 BUNION OF GREAT TOE OF LEFT FOOT: ICD-10-CM

## 2023-02-07 RX ORDER — NAPROXEN 500 MG/1
TABLET ORAL
Qty: 60 TABLET | Refills: 0 | Status: SHIPPED | OUTPATIENT
Start: 2023-02-07

## 2023-02-07 RX ORDER — METHOCARBAMOL 500 MG/1
500 TABLET, FILM COATED ORAL 4 TIMES DAILY
Qty: 40 TABLET | Refills: 2 | Status: SHIPPED | OUTPATIENT
Start: 2023-02-07 | End: 2023-03-09

## 2023-02-07 NOTE — TELEPHONE ENCOUNTER
From: Jayson Luna  To: Dr. Gail Cerna: 2/7/2023 11:56 AM EST  Subject: Refill on a previous Rx    Hi there! Could I please get a refill for the muscle relaxers I was previously prescribed? Unfortunately, I don't remember the names of them. I have been having a lot of discomfort in my back and left hip for a couple of months now. And it's hard for me to get comfortable while sleeping. If I need to make an appointment for this, that's fine too.     Thank you

## 2023-02-17 DIAGNOSIS — F33.41 RECURRENT MAJOR DEPRESSIVE DISORDER, IN PARTIAL REMISSION (HCC): ICD-10-CM

## 2023-02-18 DIAGNOSIS — F33.41 RECURRENT MAJOR DEPRESSIVE DISORDER, IN PARTIAL REMISSION (HCC): ICD-10-CM

## 2023-02-19 RX ORDER — DULOXETIN HYDROCHLORIDE 60 MG/1
CAPSULE, DELAYED RELEASE ORAL
Qty: 30 CAPSULE | Refills: 1 | Status: SHIPPED | OUTPATIENT
Start: 2023-02-19 | End: 2023-02-20

## 2023-02-20 RX ORDER — DULOXETIN HYDROCHLORIDE 60 MG/1
60 CAPSULE, DELAYED RELEASE ORAL 2 TIMES DAILY
Qty: 30 CAPSULE | Refills: 5 | Status: SHIPPED | OUTPATIENT
Start: 2023-02-20

## 2023-05-17 DIAGNOSIS — N95.1 MENOPAUSAL SYNDROME: ICD-10-CM

## 2023-05-17 RX ORDER — ESTRADIOL 1 MG/1
TABLET ORAL
Qty: 45 TABLET | Refills: 0 | Status: SHIPPED | OUTPATIENT
Start: 2023-05-17

## 2023-05-22 PROBLEM — Z00.00 WELL ADULT HEALTH CHECK: Status: ACTIVE | Noted: 2023-05-22

## 2023-06-03 DIAGNOSIS — M21.611 BUNION OF GREAT TOE OF RIGHT FOOT: ICD-10-CM

## 2023-06-03 DIAGNOSIS — M21.612 BUNION OF GREAT TOE OF LEFT FOOT: ICD-10-CM

## 2023-06-05 RX ORDER — NAPROXEN 500 MG/1
TABLET ORAL
Qty: 60 TABLET | Refills: 0 | Status: SHIPPED | OUTPATIENT
Start: 2023-06-05

## 2023-06-21 PROBLEM — Z00.00 WELL ADULT HEALTH CHECK: Status: RESOLVED | Noted: 2023-05-22 | Resolved: 2023-06-21

## 2023-08-10 ENCOUNTER — TELEMEDICINE (OUTPATIENT)
Dept: FAMILY MEDICINE CLINIC | Age: 43
End: 2023-08-10
Payer: COMMERCIAL

## 2023-08-10 DIAGNOSIS — N95.1 MENOPAUSAL SYNDROME: ICD-10-CM

## 2023-08-10 DIAGNOSIS — G43.809 OTHER MIGRAINE WITHOUT STATUS MIGRAINOSUS, NOT INTRACTABLE: ICD-10-CM

## 2023-08-10 DIAGNOSIS — M21.611 BUNION OF GREAT TOE OF RIGHT FOOT: ICD-10-CM

## 2023-08-10 DIAGNOSIS — F33.41 RECURRENT MAJOR DEPRESSIVE DISORDER, IN PARTIAL REMISSION (HCC): ICD-10-CM

## 2023-08-10 DIAGNOSIS — M21.612 BUNION OF GREAT TOE OF LEFT FOOT: ICD-10-CM

## 2023-08-10 PROCEDURE — 99214 OFFICE O/P EST MOD 30 MIN: CPT | Performed by: FAMILY MEDICINE

## 2023-08-10 PROCEDURE — G8428 CUR MEDS NOT DOCUMENT: HCPCS | Performed by: FAMILY MEDICINE

## 2023-08-10 RX ORDER — ESTRADIOL 1 MG/1
TABLET ORAL
Qty: 90 TABLET | Refills: 3 | Status: SHIPPED | OUTPATIENT
Start: 2023-08-10

## 2023-08-10 RX ORDER — NAPROXEN 500 MG/1
TABLET ORAL
Qty: 180 TABLET | Refills: 1 | Status: SHIPPED | OUTPATIENT
Start: 2023-08-10

## 2023-08-10 RX ORDER — SUMATRIPTAN 100 MG/1
TABLET, FILM COATED ORAL
Qty: 9 TABLET | Refills: 5 | Status: SHIPPED | OUTPATIENT
Start: 2023-08-10

## 2023-08-10 RX ORDER — DULOXETIN HYDROCHLORIDE 60 MG/1
60 CAPSULE, DELAYED RELEASE ORAL 2 TIMES DAILY
Qty: 180 CAPSULE | Refills: 3 | Status: SHIPPED | OUTPATIENT
Start: 2023-08-10

## 2023-08-10 SDOH — ECONOMIC STABILITY: FOOD INSECURITY: WITHIN THE PAST 12 MONTHS, THE FOOD YOU BOUGHT JUST DIDN'T LAST AND YOU DIDN'T HAVE MONEY TO GET MORE.: NEVER TRUE

## 2023-08-10 SDOH — ECONOMIC STABILITY: HOUSING INSECURITY
IN THE LAST 12 MONTHS, WAS THERE A TIME WHEN YOU DID NOT HAVE A STEADY PLACE TO SLEEP OR SLEPT IN A SHELTER (INCLUDING NOW)?: NO

## 2023-08-10 SDOH — ECONOMIC STABILITY: TRANSPORTATION INSECURITY
IN THE PAST 12 MONTHS, HAS LACK OF TRANSPORTATION KEPT YOU FROM MEETINGS, WORK, OR FROM GETTING THINGS NEEDED FOR DAILY LIVING?: NO

## 2023-08-10 SDOH — ECONOMIC STABILITY: FOOD INSECURITY: WITHIN THE PAST 12 MONTHS, YOU WORRIED THAT YOUR FOOD WOULD RUN OUT BEFORE YOU GOT MONEY TO BUY MORE.: SOMETIMES TRUE

## 2023-08-10 SDOH — ECONOMIC STABILITY: INCOME INSECURITY: HOW HARD IS IT FOR YOU TO PAY FOR THE VERY BASICS LIKE FOOD, HOUSING, MEDICAL CARE, AND HEATING?: SOMEWHAT HARD

## 2023-08-10 ASSESSMENT — PATIENT HEALTH QUESTIONNAIRE - PHQ9
1. LITTLE INTEREST OR PLEASURE IN DOING THINGS: NOT AT ALL
SUM OF ALL RESPONSES TO PHQ9 QUESTIONS 1 & 2: 0
4. FEELING TIRED OR HAVING LITTLE ENERGY: SEVERAL DAYS
2. FEELING DOWN, DEPRESSED OR HOPELESS: NOT AT ALL
9. THOUGHTS THAT YOU WOULD BE BETTER OFF DEAD, OR OF HURTING YOURSELF: 0
3. TROUBLE FALLING OR STAYING ASLEEP: NOT AT ALL
3. TROUBLE FALLING OR STAYING ASLEEP: 0
8. MOVING OR SPEAKING SO SLOWLY THAT OTHER PEOPLE COULD HAVE NOTICED. OR THE OPPOSITE, BEING SO FIGETY OR RESTLESS THAT YOU HAVE BEEN MOVING AROUND A LOT MORE THAN USUAL: 0
4. FEELING TIRED OR HAVING LITTLE ENERGY: 1
5. POOR APPETITE OR OVEREATING: NOT AT ALL
5. POOR APPETITE OR OVEREATING: 0
10. IF YOU CHECKED OFF ANY PROBLEMS, HOW DIFFICULT HAVE THESE PROBLEMS MADE IT FOR YOU TO DO YOUR WORK, TAKE CARE OF THINGS AT HOME, OR GET ALONG WITH OTHER PEOPLE: 0
1. LITTLE INTEREST OR PLEASURE IN DOING THINGS: 0
SUM OF ALL RESPONSES TO PHQ QUESTIONS 1-9: 1
6. FEELING BAD ABOUT YOURSELF - OR THAT YOU ARE A FAILURE OR HAVE LET YOURSELF OR YOUR FAMILY DOWN: 0
7. TROUBLE CONCENTRATING ON THINGS, SUCH AS READING THE NEWSPAPER OR WATCHING TELEVISION: 0
9. THOUGHTS THAT YOU WOULD BE BETTER OFF DEAD, OR OF HURTING YOURSELF: NOT AT ALL
SUM OF ALL RESPONSES TO PHQ QUESTIONS 1-9: 1
SUM OF ALL RESPONSES TO PHQ QUESTIONS 1-9: 1
7. TROUBLE CONCENTRATING ON THINGS, SUCH AS READING THE NEWSPAPER OR WATCHING TELEVISION: NOT AT ALL
6. FEELING BAD ABOUT YOURSELF - OR THAT YOU ARE A FAILURE OR HAVE LET YOURSELF OR YOUR FAMILY DOWN: NOT AT ALL
2. FEELING DOWN, DEPRESSED OR HOPELESS: 0
10. IF YOU CHECKED OFF ANY PROBLEMS, HOW DIFFICULT HAVE THESE PROBLEMS MADE IT FOR YOU TO DO YOUR WORK, TAKE CARE OF THINGS AT HOME, OR GET ALONG WITH OTHER PEOPLE: NOT DIFFICULT AT ALL
8. MOVING OR SPEAKING SO SLOWLY THAT OTHER PEOPLE COULD HAVE NOTICED. OR THE OPPOSITE - BEING SO FIDGETY OR RESTLESS THAT YOU HAVE BEEN MOVING AROUND A LOT MORE THAN USUAL: NOT AT ALL
SUM OF ALL RESPONSES TO PHQ QUESTIONS 1-9: 1
SUM OF ALL RESPONSES TO PHQ QUESTIONS 1-9: 1

## 2023-08-10 ASSESSMENT — ENCOUNTER SYMPTOMS
ABDOMINAL PAIN: 0
CHEST TIGHTNESS: 0
COUGH: 0
BACK PAIN: 0
SHORTNESS OF BREATH: 0
WHEEZING: 0

## 2023-08-10 NOTE — PROGRESS NOTES
8/10/2023    TELEHEALTH EVALUATION -- Audio and Visual Communication    TELEHEALTH services provided via [facility] application for this patient. This is an ESTABLISHED PATIENT with [facility]. Time Call began: 9:55 AM  Time Call ended: 10:03 AM    Damien Lipoma, was evaluated through a synchronous (real-time) audio-video encounter. The patient (or guardian if applicable) is aware that this is a billable service, which includes applicable co-pays. This Virtual Visit was conducted with patient's (and/or legal guardian's) consent. Patient identification was verified, and a caregiver was present when appropriate. The patient was located at Home: [place]  Provider was located at [facility] ([address] [place] [address]):  W. katharine  & Oniel Hampton[address]  [address] [facility],  [address] [facility]         Chief Complaint(s)     [place] Lipoma (:  1980) has requested an audio/video evaluation for the following concern(s):    Chief Complaint   Patient presents with    Migraine     Migraine going good occasionally breakthrough    Fibromyalgia     And stress are a little worse--may increase cymbalta    Menopause     On estradiol once a day     Has therapist and seeing them currently      Issues as above are relatively stable, mood and body pain are a little bit worse. She takes her Cymbalta 60 sometimes twice a day but usually once a day. No side effects. Was scheduled to come in today for an in person checkup as we have not seen her in more than a year but she had car problems so virtual was okay although we cannot perform a physical or any labs. Review of Systems   Constitutional:  Negative for activity change, chills, fatigue and fever. HENT:  Negative for congestion. Respiratory:  Negative for cough, chest tightness, shortness of breath and wheezing. Cardiovascular:  Negative for chest pain and leg swelling. Gastrointestinal:  Negative for abdominal pain.

## 2023-08-10 NOTE — PROGRESS NOTES
Previsit chart review: Patient has not been seen in more than a year, scheduled a \"physical\" on [facility] but then called to convert it to a virtual visit today. When converting she stated she just needed me to refill her medications. She uses intermittent NSAIDs and has not had labs since 2021, thus the only concern I have been doing a virtual today.

## 2024-04-26 DIAGNOSIS — M21.612 BUNION OF GREAT TOE OF LEFT FOOT: ICD-10-CM

## 2024-04-26 DIAGNOSIS — M21.611 BUNION OF GREAT TOE OF RIGHT FOOT: ICD-10-CM

## 2024-04-26 RX ORDER — METHOCARBAMOL 500 MG/1
500 TABLET, FILM COATED ORAL 4 TIMES DAILY
Qty: 40 TABLET | Refills: 0 | Status: SHIPPED | OUTPATIENT
Start: 2024-04-26 | End: 2024-05-26

## 2024-04-26 RX ORDER — NAPROXEN 500 MG/1
TABLET ORAL
Qty: 180 TABLET | Refills: 0 | Status: SHIPPED | OUTPATIENT
Start: 2024-04-26

## 2024-08-16 DIAGNOSIS — N95.1 MENOPAUSAL SYNDROME: ICD-10-CM

## 2024-08-17 RX ORDER — ESTRADIOL 1 MG/1
TABLET ORAL
Qty: 90 TABLET | Refills: 0 | Status: SHIPPED | OUTPATIENT
Start: 2024-08-17

## 2024-08-27 NOTE — TELEPHONE ENCOUNTER
Naproxen  Request for PA recieved via fax. Accessed chart to daniela bradley.  Outcome:    Per [facility] PA n/a
BMI: BMI (kg/m2): 33 (08-26-24 @ 23:58)  HbA1c:   Glucose:   BP: 113/76 (08-26-24 @ 23:08) (113/76 - 128/80)Vital Signs Last 24 Hrs  T(C): 36.4 (08-26-24 @ 23:58), Max: 36.7 (08-26-24 @ 23:08)  T(F): 97.6 (08-26-24 @ 23:58), Max: 98 (08-26-24 @ 23:08)  HR: 91 (08-26-24 @ 23:08) (91 - 91)  BP: 113/76 (08-26-24 @ 23:08) (113/76 - 113/76)  BP(mean): --  RR: 17 (08-26-24 @ 23:08) (17 - 17)  SpO2: 98% (08-26-24 @ 23:08) (98% - 98%)    Orthostatic VS  08-26-24 @ 23:58  Lying BP: --/-- HR: --  Sitting BP: 122/76 HR: 95  Standing BP: 133/77 HR: 105  Site: --  Mode: --    Lipid Panel: Date/Time: 04-19-24 @ 08:58  Cholesterol, Serum: 193  LDL Cholesterol Calculated: 127  HDL Cholesterol, Serum: 43  Total Cholesterol/HDL Ration Measurement: --  Triglycerides, Serum: 116

## 2024-09-29 DIAGNOSIS — G43.809 OTHER MIGRAINE WITHOUT STATUS MIGRAINOSUS, NOT INTRACTABLE: ICD-10-CM

## 2024-09-30 RX ORDER — SUMATRIPTAN 100 MG/1
TABLET, FILM COATED ORAL
Qty: 9 TABLET | Refills: 0 | Status: SHIPPED | OUTPATIENT
Start: 2024-09-30

## 2024-10-25 ENCOUNTER — OFFICE VISIT (OUTPATIENT)
Dept: FAMILY MEDICINE CLINIC | Age: 44
End: 2024-10-25
Payer: COMMERCIAL

## 2024-10-25 VITALS
HEIGHT: 61 IN | DIASTOLIC BLOOD PRESSURE: 78 MMHG | HEART RATE: 84 BPM | WEIGHT: 141 LBS | SYSTOLIC BLOOD PRESSURE: 112 MMHG | BODY MASS INDEX: 26.62 KG/M2 | OXYGEN SATURATION: 94 %

## 2024-10-25 DIAGNOSIS — R53.83 OTHER FATIGUE: ICD-10-CM

## 2024-10-25 DIAGNOSIS — F11.20 NARCOTIC DEPENDENCE (HCC): ICD-10-CM

## 2024-10-25 DIAGNOSIS — M21.611 BUNION OF GREAT TOE OF RIGHT FOOT: ICD-10-CM

## 2024-10-25 DIAGNOSIS — Z00.00 ADULT WELLNESS VISIT: Primary | ICD-10-CM

## 2024-10-25 DIAGNOSIS — Z00.01 ENCOUNTER FOR WELL ADULT EXAM WITH ABNORMAL FINDINGS: ICD-10-CM

## 2024-10-25 DIAGNOSIS — N95.1 MENOPAUSAL SYNDROME: ICD-10-CM

## 2024-10-25 DIAGNOSIS — Z12.31 ENCOUNTER FOR SCREENING MAMMOGRAM FOR MALIGNANT NEOPLASM OF BREAST: ICD-10-CM

## 2024-10-25 DIAGNOSIS — M79.7 FIBROMYALGIA: ICD-10-CM

## 2024-10-25 DIAGNOSIS — F33.41 RECURRENT MAJOR DEPRESSIVE DISORDER, IN PARTIAL REMISSION (HCC): ICD-10-CM

## 2024-10-25 DIAGNOSIS — Z23 NEED FOR INFLUENZA VACCINATION: ICD-10-CM

## 2024-10-25 DIAGNOSIS — G43.809 OTHER MIGRAINE WITHOUT STATUS MIGRAINOSUS, NOT INTRACTABLE: ICD-10-CM

## 2024-10-25 DIAGNOSIS — M21.612 BUNION OF GREAT TOE OF LEFT FOOT: ICD-10-CM

## 2024-10-25 PROCEDURE — 99396 PREV VISIT EST AGE 40-64: CPT | Performed by: FAMILY MEDICINE

## 2024-10-25 PROCEDURE — G8484 FLU IMMUNIZE NO ADMIN: HCPCS | Performed by: FAMILY MEDICINE

## 2024-10-25 RX ORDER — SUMATRIPTAN 100 MG/1
TABLET, FILM COATED ORAL
Qty: 9 TABLET | Refills: 12 | Status: SHIPPED | OUTPATIENT
Start: 2024-10-25

## 2024-10-25 RX ORDER — BUPRENORPHINE HYDROCHLORIDE AND NALOXONE HYDROCHLORIDE DIHYDRATE 8; 2 MG/1; MG/1
2 TABLET SUBLINGUAL DAILY
COMMUNITY
Start: 2024-09-16

## 2024-10-25 RX ORDER — DULOXETIN HYDROCHLORIDE 60 MG/1
60 CAPSULE, DELAYED RELEASE ORAL 2 TIMES DAILY
Qty: 180 CAPSULE | Refills: 3 | Status: SHIPPED | OUTPATIENT
Start: 2024-10-25

## 2024-10-25 RX ORDER — MUPIROCIN 20 MG/G
OINTMENT TOPICAL 3 TIMES DAILY
COMMUNITY

## 2024-10-25 RX ORDER — ESTRADIOL 1 MG/1
TABLET ORAL
Qty: 90 TABLET | Refills: 3 | Status: SHIPPED | OUTPATIENT
Start: 2024-10-25

## 2024-10-25 RX ORDER — TRETINOIN 0.25 MG/G
CREAM TOPICAL NIGHTLY
COMMUNITY

## 2024-10-25 RX ORDER — NAPROXEN 500 MG/1
TABLET ORAL
Qty: 180 TABLET | Refills: 3 | Status: SHIPPED | OUTPATIENT
Start: 2024-10-25

## 2024-10-25 ASSESSMENT — ENCOUNTER SYMPTOMS
ABDOMINAL PAIN: 0
COUGH: 0
BACK PAIN: 0
WHEEZING: 0
SHORTNESS OF BREATH: 0
CHEST TIGHTNESS: 0

## 2024-10-25 NOTE — PROGRESS NOTES
Chief Complaint   Patient presents with    Annual Exam     Patient is requesting an annual checkup    Chronic Pain     Patient with known fibromyalgia, takes NSAIDs, duloxetine 60 twice daily    Migraine     Patient has Imitrex to use as needed headaches    Medication assisted therapy     Patient with long-term medication assisted therapy, on Suboxone 8-2 twice daily long-term from provider in [facility] NARRATIVE:    History of Present Illness  The patient presents for a physical exam and recheck on chronic pain syndrome from fibromyalgia and migraines. She is also continued on Suboxone therapy for medication-assisted treatment.    She has been experiencing migraines for the past few weeks, which have been managed with Imitrex. She has not previously used any preventive medications.    She reports body aches, which are alleviated by massages from her daughter, a massage therapist. She expresses a desire to visit a chiropractor again, as this has previously helped her manage her symptoms without the need for additional medication. She is currently taking Cymbalta and Suboxone twice daily without any side effects. She feels sore after sitting for extended periods, such as during her morning coffee, and on rainy days. Despite these challenges, she remains active and finds that movement helps alleviate her discomfort. She notes that some days are worse than others, but overall, she feels her condition is manageable.    She declines the influenza vaccine and reports no issues with heartburn or lower gastrointestinal problems.    She is currently taking estradiol and is interested in exploring other options for hormone replacement therapy, such as implants. She occasionally experiences hot flashes and dryness. She has tried increasing her estradiol dosage to 1.5 tablets, but this resulted in increased headaches, so she has returned to taking 1 tablet.    She is not fasting but will be offered fasting blood

## 2025-03-11 RX ORDER — METHOCARBAMOL 500 MG/1
500 TABLET, FILM COATED ORAL 4 TIMES DAILY
Qty: 40 TABLET | Refills: 0 | Status: SHIPPED | OUTPATIENT
Start: 2025-03-11 | End: 2025-04-10

## 2025-05-28 ENCOUNTER — COMMUNITY OUTREACH (OUTPATIENT)
Dept: FAMILY MEDICINE CLINIC | Age: 45
End: 2025-05-28

## 2025-07-14 RX ORDER — METHOCARBAMOL 500 MG/1
500 TABLET, FILM COATED ORAL 4 TIMES DAILY
Qty: 40 TABLET | Refills: 0 | Status: SHIPPED | OUTPATIENT
Start: 2025-07-14 | End: 2025-08-13

## 2025-07-15 ENCOUNTER — OFFICE VISIT (OUTPATIENT)
Dept: FAMILY MEDICINE CLINIC | Age: 45
End: 2025-07-15

## 2025-07-15 VITALS
HEART RATE: 89 BPM | OXYGEN SATURATION: 98 % | BODY MASS INDEX: 28.15 KG/M2 | DIASTOLIC BLOOD PRESSURE: 80 MMHG | SYSTOLIC BLOOD PRESSURE: 120 MMHG | WEIGHT: 149 LBS

## 2025-07-15 DIAGNOSIS — G43.709 CHRONIC MIGRAINE WITHOUT AURA WITHOUT STATUS MIGRAINOSUS, NOT INTRACTABLE: Primary | ICD-10-CM

## 2025-07-15 DIAGNOSIS — M79.7 FIBROMYALGIA: ICD-10-CM

## 2025-07-15 DIAGNOSIS — M79.18 CERVICAL MYOFASCIAL PAIN SYNDROME: ICD-10-CM

## 2025-07-15 DIAGNOSIS — F11.20 NARCOTIC DEPENDENCE (HCC): ICD-10-CM

## 2025-07-15 DIAGNOSIS — F33.41 RECURRENT MAJOR DEPRESSIVE DISORDER, IN PARTIAL REMISSION: ICD-10-CM

## 2025-07-15 RX ORDER — TOPIRAMATE 50 MG/1
50 TABLET, FILM COATED ORAL 2 TIMES DAILY
Qty: 60 TABLET | Refills: 1 | Status: SHIPPED | OUTPATIENT
Start: 2025-07-15

## 2025-07-15 SDOH — ECONOMIC STABILITY: FOOD INSECURITY: WITHIN THE PAST 12 MONTHS, YOU WORRIED THAT YOUR FOOD WOULD RUN OUT BEFORE YOU GOT MONEY TO BUY MORE.: SOMETIMES TRUE

## 2025-07-15 SDOH — ECONOMIC STABILITY: FOOD INSECURITY: WITHIN THE PAST 12 MONTHS, THE FOOD YOU BOUGHT JUST DIDN'T LAST AND YOU DIDN'T HAVE MONEY TO GET MORE.: SOMETIMES TRUE

## 2025-07-15 ASSESSMENT — PATIENT HEALTH QUESTIONNAIRE - PHQ9
SUM OF ALL RESPONSES TO PHQ QUESTIONS 1-9: 1
3. TROUBLE FALLING OR STAYING ASLEEP: NOT AT ALL
5. POOR APPETITE OR OVEREATING: SEVERAL DAYS
8. MOVING OR SPEAKING SO SLOWLY THAT OTHER PEOPLE COULD HAVE NOTICED. OR THE OPPOSITE, BEING SO FIGETY OR RESTLESS THAT YOU HAVE BEEN MOVING AROUND A LOT MORE THAN USUAL: NOT AT ALL
SUM OF ALL RESPONSES TO PHQ QUESTIONS 1-9: 1
1. LITTLE INTEREST OR PLEASURE IN DOING THINGS: NOT AT ALL
10. IF YOU CHECKED OFF ANY PROBLEMS, HOW DIFFICULT HAVE THESE PROBLEMS MADE IT FOR YOU TO DO YOUR WORK, TAKE CARE OF THINGS AT HOME, OR GET ALONG WITH OTHER PEOPLE: NOT DIFFICULT AT ALL
SUM OF ALL RESPONSES TO PHQ QUESTIONS 1-9: 1
SUM OF ALL RESPONSES TO PHQ QUESTIONS 1-9: 1
9. THOUGHTS THAT YOU WOULD BE BETTER OFF DEAD, OR OF HURTING YOURSELF: NOT AT ALL
6. FEELING BAD ABOUT YOURSELF - OR THAT YOU ARE A FAILURE OR HAVE LET YOURSELF OR YOUR FAMILY DOWN: NOT AT ALL
2. FEELING DOWN, DEPRESSED OR HOPELESS: NOT AT ALL
7. TROUBLE CONCENTRATING ON THINGS, SUCH AS READING THE NEWSPAPER OR WATCHING TELEVISION: NOT AT ALL
4. FEELING TIRED OR HAVING LITTLE ENERGY: NOT AT ALL

## 2025-07-15 NOTE — PROGRESS NOTES
Chief Complaint   Patient presents with    Migraine     Pt reports nearly everyday migraines .     suboxone     Long term suboxone therapy    Depression     On cymbalta for mood and fibromyalgia       Augustine NARRATIVE:    History of Present Illness  The patient presents to discuss migraines and had sent a message inquiring about Topamax.    She is experiencing daily migraines, which have been worsening in severity. The migraines typically begin in the morning, either upon waking or within an hour of starting her day. She recalls a particularly severe episode on Sunday that left her incapacitated. The location of the pain varies, and she occasionally experiences nausea and vision disturbances. However, she does not report any instances of vomiting. She also reports sensitivity to light and loud noises. No specific triggers for the migraines have been identified, such as food or sun exposure. She does not wear corrective lenses. She has sought emergency care for her migraines on a few occasions.    She continues to take Suboxone and Cymbalta. She occasionally uses Robaxin and Naprosyn, but not on a daily basis. She also takes Imitrex, which she finds helpful.    Social History:  Occupations: Works for the [employer]  Living Condition: Lives with three grandchildren  Patient had sent message earlier this month wondering whether she could take a daily medication to prevent migraines.  She was interested in Topamax.  I asked her to come in as we have not seen her since October.    Patient is established unless otherwise noted.  Above chief complaint and Augustine obtained by this physician provider.     Patient consented to using an automated transcription service to record our visit and provide summary directly into the chart.    Review of Systems   Constitutional:  Negative for activity change, chills, fatigue and fever.   HENT:  Negative for congestion.    Respiratory:  Negative for cough, chest tightness, shortness of

## 2025-08-03 DIAGNOSIS — G43.809 OTHER MIGRAINE WITHOUT STATUS MIGRAINOSUS, NOT INTRACTABLE: ICD-10-CM

## 2025-08-04 RX ORDER — SUMATRIPTAN SUCCINATE 100 MG/1
TABLET ORAL
Qty: 9 TABLET | Refills: 5 | Status: SHIPPED | OUTPATIENT
Start: 2025-08-04